# Patient Record
Sex: MALE | Race: WHITE | NOT HISPANIC OR LATINO | URBAN - METROPOLITAN AREA
[De-identification: names, ages, dates, MRNs, and addresses within clinical notes are randomized per-mention and may not be internally consistent; named-entity substitution may affect disease eponyms.]

---

## 2017-08-18 ENCOUNTER — INPATIENT (INPATIENT)
Facility: HOSPITAL | Age: 77
LOS: 0 days | Discharge: HOME | End: 2017-08-19
Attending: INTERNAL MEDICINE | Admitting: INTERNAL MEDICINE

## 2017-08-18 DIAGNOSIS — E78.5 HYPERLIPIDEMIA, UNSPECIFIED: ICD-10-CM

## 2017-08-18 DIAGNOSIS — Z87.898 PERSONAL HISTORY OF OTHER SPECIFIED CONDITIONS: ICD-10-CM

## 2017-08-18 DIAGNOSIS — R55 SYNCOPE AND COLLAPSE: ICD-10-CM

## 2017-08-18 DIAGNOSIS — R00.2 PALPITATIONS: ICD-10-CM

## 2017-08-22 DIAGNOSIS — Z87.891 PERSONAL HISTORY OF NICOTINE DEPENDENCE: ICD-10-CM

## 2017-08-22 DIAGNOSIS — S80.812A ABRASION, LEFT LOWER LEG, INITIAL ENCOUNTER: ICD-10-CM

## 2017-08-22 DIAGNOSIS — Z95.810 PRESENCE OF AUTOMATIC (IMPLANTABLE) CARDIAC DEFIBRILLATOR: ICD-10-CM
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
## 2017-08-22 DIAGNOSIS — I10 ESSENTIAL (PRIMARY) HYPERTENSION: ICD-10-CM
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
## 2017-08-22 DIAGNOSIS — R55 SYNCOPE AND COLLAPSE: ICD-10-CM
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
## 2017-08-22 DIAGNOSIS — M54.9 DORSALGIA, UNSPECIFIED: ICD-10-CM

## 2017-08-22 DIAGNOSIS — G89.29 OTHER CHRONIC PAIN: ICD-10-CM

## 2017-08-22 DIAGNOSIS — F10.129 ALCOHOL ABUSE WITH INTOXICATION, UNSPECIFIED: ICD-10-CM

## 2017-08-22 DIAGNOSIS — I25.10 ATHEROSCLEROTIC HEART DISEASE OF NATIVE CORONARY ARTERY WITHOUT ANGINA PECTORIS: ICD-10-CM

## 2018-12-10 ENCOUNTER — OUTPATIENT (OUTPATIENT)
Dept: OUTPATIENT SERVICES | Facility: HOSPITAL | Age: 78
LOS: 1 days | Discharge: HOME | End: 2018-12-10

## 2018-12-10 DIAGNOSIS — I25.10 ATHEROSCLEROTIC HEART DISEASE OF NATIVE CORONARY ARTERY WITHOUT ANGINA PECTORIS: ICD-10-CM

## 2019-08-26 ENCOUNTER — APPOINTMENT (OUTPATIENT)
Dept: CARDIOLOGY | Facility: CLINIC | Age: 79
End: 2019-08-26
Payer: MEDICARE

## 2019-08-26 PROCEDURE — 99213 OFFICE O/P EST LOW 20 MIN: CPT | Mod: 25

## 2019-08-26 PROCEDURE — 93283 PRGRMG EVAL IMPLANTABLE DFB: CPT | Mod: 59

## 2019-09-12 ENCOUNTER — APPOINTMENT (OUTPATIENT)
Dept: CARDIOLOGY | Facility: CLINIC | Age: 79
End: 2019-09-12
Payer: MEDICARE

## 2019-09-12 PROCEDURE — 93306 TTE W/DOPPLER COMPLETE: CPT

## 2019-12-16 ENCOUNTER — APPOINTMENT (OUTPATIENT)
Dept: CARDIOLOGY | Facility: CLINIC | Age: 79
End: 2019-12-16
Payer: MEDICARE

## 2019-12-16 PROCEDURE — 99213 OFFICE O/P EST LOW 20 MIN: CPT | Mod: 25

## 2019-12-16 PROCEDURE — 93283 PRGRMG EVAL IMPLANTABLE DFB: CPT | Mod: 59

## 2020-06-03 ENCOUNTER — EMERGENCY (EMERGENCY)
Facility: HOSPITAL | Age: 80
LOS: 0 days | Discharge: HOME | End: 2020-06-03
Attending: EMERGENCY MEDICINE | Admitting: EMERGENCY MEDICINE
Payer: MEDICARE

## 2020-06-03 VITALS
SYSTOLIC BLOOD PRESSURE: 133 MMHG | TEMPERATURE: 97 F | OXYGEN SATURATION: 95 % | HEART RATE: 65 BPM | DIASTOLIC BLOOD PRESSURE: 79 MMHG | RESPIRATION RATE: 18 BRPM

## 2020-06-03 DIAGNOSIS — M25.519 PAIN IN UNSPECIFIED SHOULDER: ICD-10-CM

## 2020-06-03 DIAGNOSIS — F17.210 NICOTINE DEPENDENCE, CIGARETTES, UNCOMPLICATED: ICD-10-CM

## 2020-06-03 DIAGNOSIS — I10 ESSENTIAL (PRIMARY) HYPERTENSION: ICD-10-CM

## 2020-06-03 DIAGNOSIS — E78.5 HYPERLIPIDEMIA, UNSPECIFIED: ICD-10-CM

## 2020-06-03 DIAGNOSIS — M25.512 PAIN IN LEFT SHOULDER: ICD-10-CM

## 2020-06-03 PROCEDURE — 99283 EMERGENCY DEPT VISIT LOW MDM: CPT

## 2020-06-03 PROCEDURE — 73030 X-RAY EXAM OF SHOULDER: CPT | Mod: 26,LT

## 2020-06-03 RX ORDER — IBUPROFEN 200 MG
600 TABLET ORAL ONCE
Refills: 0 | Status: COMPLETED | OUTPATIENT
Start: 2020-06-03 | End: 2020-06-03

## 2020-06-03 RX ADMIN — Medication 600 MILLIGRAM(S): at 13:48

## 2020-06-03 NOTE — ED PROVIDER NOTE - OBJECTIVE STATEMENT
78 yo male with a pmh of HTN, HLD, pacer, and chronic shoulder pain present c/p L shoulder pain. pt states pain is always presents but has been worsed fot the last two month. denies any recent injuries/fall/trauma. denies any other symptoms including fevers, chill, headache, recent illness/travel, cough, abdominal pain, chest pain, or SOB. follows with Dr. Gorman for pain management.

## 2020-06-03 NOTE — ED PROVIDER NOTE - CCCP TRG CHIEF CMPLNT
Refill request received for Boost. Last refilled 7/3/19. Refilled per protocol. Hospice patient.  
shoulder pain/injury

## 2020-06-03 NOTE — ED PROVIDER NOTE - NSFOLLOWUPINSTRUCTIONS_ED_ALL_ED_FT
Please follow up with your primary care physician within 24-72 hours and return immediately if symptoms worsen.    Shoulder Pain    AMBULATORY CARE:    Shoulder pain is a common problem that can affect your daily activities. Pain can be caused by a problem within your shoulder, such as soreness of a tendon or bursa. A tendon is a cord of tough tissue that connects your muscles to your bones. The bursa is a fluid-filled sac that acts as a cushion between a bone and a tendon. Shoulder pain may also be caused by pain that spreads to your shoulder from another part of your body.Shoulder Anatomy         Seek care immediately if:     You have severe pain.      You cannot move your arm or shoulder.      You have numbness or tingling in your shoulder or arm.    Contact your healthcare provider if:     Your pain gets worse or does not go away with treatment.      You have trouble moving your arm or shoulder.      You have questions or concerns about your condition or care.    Treatment for shoulder pain may include any of the following:     Acetaminophen decreases pain and fever. It is available without a doctor's order. Ask how much to take and how often to take it. Follow directions. Read the labels of all other medicines you are using to see if they also contain acetaminophen, or ask your doctor or pharmacist. Acetaminophen can cause liver damage if not taken correctly. Do not use more than 4 grams (4,000 milligrams) total of acetaminophen in one day.       NSAIDs, such as ibuprofen, help decrease swelling, pain, and fever. This medicine is available with or without a doctor's order. NSAIDs can cause stomach bleeding or kidney problems in certain people. If you take blood thinner medicine, always ask your healthcare provider if NSAIDs are safe for you. Always read the medicine label and follow directions.      A steroid injection may help decrease pain and swelling.      Surgery may be needed for long-term pain and loss of function.    Manage your symptoms:     Apply ice on your shoulder for 20 to 30 minutes every 2 hours or as directed. Use an ice pack, or put crushed ice in a plastic bag. Cover it with a towel before you apply it to your shoulder. Ice helps prevent tissue damage and decreases swelling and pain.      Apply heat if ice does not help your symptoms. Apply heat on your shoulder for 20 to 30 minutes every 2 hours for as many days as directed. Heat helps decrease pain and muscle spasms.      Limit activities as directed. Try to avoid repeated overhead movements.      Go to physical or occupational therapy as directed. A physical therapist teaches you exercises to help improve movement and strength, and to decrease pain. An occupational therapist teaches you skills to help with your daily activities.     Prevent shoulder pain:     Maintain a good range of motion in your shoulder. Ask your healthcare provider which exercises you should do on a regular basis after you have healed.       Stretch and strengthen your shoulder. Use proper technique during exercises and sports.    Follow up with your healthcare provider or orthopedist as directed: Write down your questions so you remember to ask them during your visits.

## 2020-06-03 NOTE — ED PROVIDER NOTE - PATIENT PORTAL LINK FT
You can access the FollowMyHealth Patient Portal offered by [facility] by registering at the following website: [email or web address]. By joining [employer]’s FollowMyHealth portal, you will also be able to view your health information using other applications (apps) compatible with our system.

## 2020-06-03 NOTE — ED PROVIDER NOTE - NSFOLLOWUPCLINICS_GEN_ALL_ED_FT
[facility] ([facility])  Rehabilitation  [facility] [address], [address] [place] [address]  Phone: [phone]  Fax:   Follow Up Time: 1-3 Days

## 2020-06-03 NOTE — ED PROVIDER NOTE - PHYSICAL EXAMINATION
Gen: NAD, AOx3  Head: NCAT  HEENT: PERRL, oral mucosa moist, normal conjunctiva, oropharynx clear without exudate or erythema  Lung: CTAB, no respiratory distress, no wheezing, rales, rhonchi  CV: normal s1/s2, rrr, Normal perfusion, pulses 2+ throughout  Abd: soft, NTND, no CVA tenderness  Genitourinary: no pelvic tenderness  MSK: No edema, no visible deformities, full range of motion in all 4 extremities, LUE: no edema/erythema/abrasions/lesions, cap refill <2 seconds, AROM, negative harris/neer test  Neuro: No focal neurologic deficits  Skin: No rash   Psych: normal affect

## 2020-06-03 NOTE — ED PROVIDER NOTE - CLINICAL SUMMARY MEDICAL DECISION MAKING FREE TEXT BOX
Pt with chronic L shoulder pain, worse after fall 1 month ago, here for imaging as was unable to get outpt MRI ordered by his pain management doctor.  x-ray with arthritis, no acute findings, pt to f/u with his pain management.

## 2020-06-03 NOTE — ED PROVIDER NOTE - ATTENDING CONTRIBUTION TO CARE
80 y/o male with h/o htn, s/p PPP, in ER with c/o L shoulder pain.  Pt states he has many old injuries and always has pain to this area, but had a fall ~ 1 month ago and it aggravated the pain.  Pain when he moves it, feels clicks sometimes, but states he's still been able to play golf.  He follows with pain management, states he had orders to go for MRI, but they told him couldn't be done 2/2 pacemaker.  Pt felt he needed some imaging and so came to ER.  no new injury.  no paresthesias or motor weakness.  no cp/sob.  no back pain.  abd pain.  no f/c.  no n/v.  no other complaints.  PE - nad, nc/at, eomi, perrl, op - clear, neck supple, cta b/l, no w/r/r, rrr, abd - soft, nt/nd, nabs, LUE - from to shoulder, no point tenderness, no swelling, no erythema, 2+ radial pulse, no motor/sensory deficits.  -check x-ray.

## 2020-06-04 ENCOUNTER — OUTPATIENT (OUTPATIENT)
Dept: OUTPATIENT SERVICES | Facility: HOSPITAL | Age: 80
LOS: 1 days | Discharge: HOME | End: 2020-06-04
Payer: MEDICARE

## 2020-06-04 DIAGNOSIS — M25.512 PAIN IN LEFT SHOULDER: ICD-10-CM

## 2020-06-04 PROCEDURE — 73200 CT UPPER EXTREMITY W/O DYE: CPT | Mod: 26,LT

## 2020-06-15 ENCOUNTER — OUTPATIENT (OUTPATIENT)
Dept: OUTPATIENT SERVICES | Facility: HOSPITAL | Age: 80
LOS: 1 days | Discharge: HOME | End: 2020-06-15
Payer: MEDICARE

## 2020-06-15 DIAGNOSIS — M75.102 UNSPECIFIED ROTATOR CUFF TEAR OR RUPTURE OF LEFT SHOULDER, NOT SPECIFIED AS TRAUMATIC: ICD-10-CM

## 2020-06-15 PROCEDURE — 73200 CT UPPER EXTREMITY W/O DYE: CPT | Mod: 26,LT

## 2020-06-15 PROCEDURE — 23350 INJECTION FOR SHOULDER X-RAY: CPT | Mod: LT

## 2020-06-15 PROCEDURE — 73040 CONTRAST X-RAY OF SHOULDER: CPT | Mod: 26,LT

## 2020-07-30 ENCOUNTER — RECORD ABSTRACTING (OUTPATIENT)
Age: 80
End: 2020-07-30

## 2020-07-30 DIAGNOSIS — I49.3 VENTRICULAR PREMATURE DEPOLARIZATION: ICD-10-CM

## 2020-07-30 DIAGNOSIS — Z87.891 PERSONAL HISTORY OF NICOTINE DEPENDENCE: ICD-10-CM

## 2020-07-30 DIAGNOSIS — K46.9 UNSPECIFIED ABDOMINAL HERNIA W/OUT OBSTRUCTION OR GANGRENE: ICD-10-CM

## 2020-07-30 DIAGNOSIS — Z86.79 PERSONAL HISTORY OF OTHER DISEASES OF THE CIRCULATORY SYSTEM: ICD-10-CM

## 2020-07-30 DIAGNOSIS — I42.6 ALCOHOLIC CARDIOMYOPATHY: ICD-10-CM

## 2020-07-30 DIAGNOSIS — I47.2 VENTRICULAR TACHYCARDIA: ICD-10-CM

## 2020-07-30 DIAGNOSIS — Z80.0 FAMILY HISTORY OF MALIGNANT NEOPLASM OF DIGESTIVE ORGANS: ICD-10-CM

## 2020-07-30 DIAGNOSIS — I45.10 UNSPECIFIED RIGHT BUNDLE-BRANCH BLOCK: ICD-10-CM

## 2020-08-03 ENCOUNTER — EMERGENCY (EMERGENCY)
Facility: HOSPITAL | Age: 80
LOS: 0 days | Discharge: HOME | End: 2020-08-03
Attending: EMERGENCY MEDICINE | Admitting: EMERGENCY MEDICINE
Payer: MEDICARE

## 2020-08-03 VITALS
OXYGEN SATURATION: 99 % | RESPIRATION RATE: 18 BRPM | SYSTOLIC BLOOD PRESSURE: 140 MMHG | TEMPERATURE: 98 F | DIASTOLIC BLOOD PRESSURE: 75 MMHG | HEART RATE: 61 BPM

## 2020-08-03 VITALS
TEMPERATURE: 98 F | SYSTOLIC BLOOD PRESSURE: 128 MMHG | OXYGEN SATURATION: 98 % | WEIGHT: 190.04 LBS | RESPIRATION RATE: 18 BRPM | HEART RATE: 77 BPM | HEIGHT: 72 IN | DIASTOLIC BLOOD PRESSURE: 92 MMHG

## 2020-08-03 DIAGNOSIS — R07.89 OTHER CHEST PAIN: ICD-10-CM

## 2020-08-03 DIAGNOSIS — R42 DIZZINESS AND GIDDINESS: ICD-10-CM

## 2020-08-03 DIAGNOSIS — F17.200 NICOTINE DEPENDENCE, UNSPECIFIED, UNCOMPLICATED: ICD-10-CM

## 2020-08-03 DIAGNOSIS — I10 ESSENTIAL (PRIMARY) HYPERTENSION: ICD-10-CM

## 2020-08-03 DIAGNOSIS — Z95.810 PRESENCE OF AUTOMATIC (IMPLANTABLE) CARDIAC DEFIBRILLATOR: ICD-10-CM

## 2020-08-03 DIAGNOSIS — R11.0 NAUSEA: ICD-10-CM

## 2020-08-03 DIAGNOSIS — Z95.0 PRESENCE OF CARDIAC PACEMAKER: ICD-10-CM

## 2020-08-03 DIAGNOSIS — E78.00 PURE HYPERCHOLESTEROLEMIA, UNSPECIFIED: ICD-10-CM

## 2020-08-03 DIAGNOSIS — E78.5 HYPERLIPIDEMIA, UNSPECIFIED: ICD-10-CM

## 2020-08-03 LAB
ALBUMIN SERPL ELPH-MCNC: 4.8 G/DL — SIGNIFICANT CHANGE UP (ref 3.5–5.2)
ALP SERPL-CCNC: 71 U/L — SIGNIFICANT CHANGE UP (ref 30–115)
ALT FLD-CCNC: 32 U/L — SIGNIFICANT CHANGE UP (ref 0–41)
ANION GAP SERPL CALC-SCNC: 14 MMOL/L — SIGNIFICANT CHANGE UP (ref 7–14)
AST SERPL-CCNC: 20 U/L — SIGNIFICANT CHANGE UP (ref 0–41)
BASOPHILS # BLD AUTO: 0.05 K/UL — SIGNIFICANT CHANGE UP (ref 0–0.2)
BASOPHILS NFR BLD AUTO: 0.6 % — SIGNIFICANT CHANGE UP (ref 0–1)
BILIRUB SERPL-MCNC: 0.8 MG/DL — SIGNIFICANT CHANGE UP (ref 0.2–1.2)
BUN SERPL-MCNC: 20 MG/DL — SIGNIFICANT CHANGE UP (ref 10–20)
CALCIUM SERPL-MCNC: 9.8 MG/DL — SIGNIFICANT CHANGE UP (ref 8.5–10.1)
CHLORIDE SERPL-SCNC: 104 MMOL/L — SIGNIFICANT CHANGE UP (ref 98–110)
CO2 SERPL-SCNC: 26 MMOL/L — SIGNIFICANT CHANGE UP (ref 17–32)
CREAT SERPL-MCNC: 1.7 MG/DL — HIGH (ref 0.7–1.5)
EOSINOPHIL # BLD AUTO: 0.12 K/UL — SIGNIFICANT CHANGE UP (ref 0–0.7)
EOSINOPHIL NFR BLD AUTO: 1.5 % — SIGNIFICANT CHANGE UP (ref 0–8)
GLUCOSE SERPL-MCNC: 114 MG/DL — HIGH (ref 70–99)
HCT VFR BLD CALC: 43.8 % — SIGNIFICANT CHANGE UP (ref 42–52)
HGB BLD-MCNC: 14.2 G/DL — SIGNIFICANT CHANGE UP (ref 14–18)
IMM GRANULOCYTES NFR BLD AUTO: 0.4 % — HIGH (ref 0.1–0.3)
LYMPHOCYTES # BLD AUTO: 1.5 K/UL — SIGNIFICANT CHANGE UP (ref 1.2–3.4)
LYMPHOCYTES # BLD AUTO: 18.6 % — LOW (ref 20.5–51.1)
MAGNESIUM SERPL-MCNC: 1.9 MG/DL — SIGNIFICANT CHANGE UP (ref 1.8–2.4)
MCHC RBC-ENTMCNC: 29.8 PG — SIGNIFICANT CHANGE UP (ref 27–31)
MCHC RBC-ENTMCNC: 32.4 G/DL — SIGNIFICANT CHANGE UP (ref 32–37)
MCV RBC AUTO: 92 FL — SIGNIFICANT CHANGE UP (ref 80–94)
MONOCYTES # BLD AUTO: 0.66 K/UL — HIGH (ref 0.1–0.6)
MONOCYTES NFR BLD AUTO: 8.2 % — SIGNIFICANT CHANGE UP (ref 1.7–9.3)
NEUTROPHILS # BLD AUTO: 5.71 K/UL — SIGNIFICANT CHANGE UP (ref 1.4–6.5)
NEUTROPHILS NFR BLD AUTO: 70.7 % — SIGNIFICANT CHANGE UP (ref 42.2–75.2)
NRBC # BLD: 0 /100 WBCS — SIGNIFICANT CHANGE UP (ref 0–0)
NT-PROBNP SERPL-SCNC: 421 PG/ML — HIGH (ref 0–300)
PLATELET # BLD AUTO: 315 K/UL — SIGNIFICANT CHANGE UP (ref 130–400)
POTASSIUM SERPL-MCNC: 3.9 MMOL/L — SIGNIFICANT CHANGE UP (ref 3.5–5)
POTASSIUM SERPL-SCNC: 3.9 MMOL/L — SIGNIFICANT CHANGE UP (ref 3.5–5)
PROT SERPL-MCNC: 6.4 G/DL — SIGNIFICANT CHANGE UP (ref 6–8)
RBC # BLD: 4.76 M/UL — SIGNIFICANT CHANGE UP (ref 4.7–6.1)
RBC # FLD: 14.7 % — HIGH (ref 11.5–14.5)
SODIUM SERPL-SCNC: 144 MMOL/L — SIGNIFICANT CHANGE UP (ref 135–146)
TROPONIN T SERPL-MCNC: <0.01 NG/ML — SIGNIFICANT CHANGE UP
WBC # BLD: 8.07 K/UL — SIGNIFICANT CHANGE UP (ref 4.8–10.8)
WBC # FLD AUTO: 8.07 K/UL — SIGNIFICANT CHANGE UP (ref 4.8–10.8)

## 2020-08-03 PROCEDURE — 99285 EMERGENCY DEPT VISIT HI MDM: CPT

## 2020-08-03 PROCEDURE — 93010 ELECTROCARDIOGRAM REPORT: CPT

## 2020-08-03 PROCEDURE — 70450 CT HEAD/BRAIN W/O DYE: CPT | Mod: 26

## 2020-08-03 PROCEDURE — 71045 X-RAY EXAM CHEST 1 VIEW: CPT | Mod: 26

## 2020-08-03 RX ORDER — SODIUM CHLORIDE 9 MG/ML
500 INJECTION INTRAMUSCULAR; INTRAVENOUS; SUBCUTANEOUS ONCE
Refills: 0 | Status: COMPLETED | OUTPATIENT
Start: 2020-08-03 | End: 2020-08-03

## 2020-08-03 RX ADMIN — SODIUM CHLORIDE 500 MILLILITER(S): 9 INJECTION INTRAMUSCULAR; INTRAVENOUS; SUBCUTANEOUS at 08:03

## 2020-08-03 NOTE — ED PROVIDER NOTE - CLINICAL SUMMARY MEDICAL DECISION MAKING FREE TEXT BOX
Patient urged to remain in the hospital on telemetry for further workup of ACS including repeat troponin, EKG and EP evaluation.  Patient refuses further care.  Wants to go home.  AAOx3.  See progress notes.

## 2020-08-03 NOTE — ED ADULT NURSE REASSESSMENT NOTE - NS ED NURSE REASSESS COMMENT FT1
not seen by MD yet , no orders yet
pt assessed, pt resting comfortable in bed, pt on cardiac monitor, pt ambulated to bathroom with steady gait, safety and comfort maintained, will continue to monitor.

## 2020-08-03 NOTE — ED ADULT NURSE NOTE - OBJECTIVE STATEMENT
Pt presents c/o midsternal chest discomfort  , dizziness , occipital headache that started last night  . pt HX defibrillator placement , AO x 4 , speaks in full sentences , no labored breathing noted , denies syncopal episode , denies nausea , denies abdominal pain , no vomiting noted , denies palpitations , denies back pain , denies dysuria  , afebrile , AO x 4 , no cough , no SOB , ambulates with steady gait , denies visual  changes ., placed on continuous cardiac monitor

## 2020-08-03 NOTE — ED ADULT NURSE NOTE - NSIMPLEMENTINTERV_GEN_ALL_ED
Implemented All Universal Safety Interventions:  [place] to call system. Call bell, personal items and telephone within reach. Instruct patient to call for assistance. Room bathroom lighting operational. Non-slip footwear when patient is off stretcher. Physically safe environment: no spills, clutter or unnecessary equipment. Stretcher in lowest position, wheels locked, appropriate side rails in place.

## 2020-08-03 NOTE — ED PROVIDER NOTE - PATIENT PORTAL LINK FT
You can access the FollowMyHealth Patient Portal offered by [facility] by registering at the following website: [email or web address]. By joining [employer]’s FollowMyHealth portal, you will also be able to view your health information using other applications (apps) compatible with our system.

## 2020-08-03 NOTE — ED PROVIDER NOTE - NSFOLLOWUPINSTRUCTIONS_ED_ALL_ED_FT
Dizziness    Dizziness can manifest as a feeling of unsteadiness or light-headedness. You may feel like you are about to faint. This condition can be caused by a number of things, including medicines, dehydration, or illness. Drink enough fluid to keep your urine clear or pale yellow. Do not drink alcohol and limit your caffeine intake. Avoid quick or sudden movements.  Rise slowly from chairs and steady yourself until you feel okay. In the morning, first sit up on the side of the bed.    SEEK IMMEDIATE MEDICAL CARE IF YOU HAVE ANY OF THE FOLLOWING SYMPTOMS: vomiting, changes in your vision or speech, weakness in your arms or legs, trouble speaking or swallowing, chest pain, abdominal pain, shortness of breath, sweating, bleeding, headache, neck pain, or fever.    Chest Pain    Chest pain can be caused by many different conditions which may or may not be dangerous. Causes include heartburn, lung infections, heart attack, blood clot in lungs, skin infections, strain or damage to muscle, cartilage, or bones, etc. In addition to a history and physical examination, an electrocardiogram (ECG) or other lab tests may have been performed to determine the cause of your chest pain. Follow up with your primary care provider or with a cardiologist as instructed.     SEEK IMMEDIATE MEDICAL CARE IF YOU HAVE ANY OF THE FOLLOWING SYMPTOMS: worsening chest pain, coughing up blood, unexplained back/neck/jaw pain, severe abdominal pain, dizziness or lightheadedness, fainting, shortness of breath, sweaty or clammy skin, vomiting, or racing heart beat. These symptoms may represent a serious problem that is an emergency. Do not wait to see if the symptoms will go away. Get medical help right away. Call 911 and do not drive yourself to the hospital.

## 2020-08-03 NOTE — ED PROVIDER NOTE - OBJECTIVE STATEMENT
78 y/o male with Pacemaker /Defibrillator, HTN, HDL, smoker presents to the ED c/o "I got up to go to the bathroom this morning and I felt very dizzy and nauseous. I have left sided chest burning. My defibrillator didn't go off." no SOB/ cough/ fever/ chills 78 y/o male with Pacemaker /Defibrillator, HTN, HDL, Dementia smoker presents to the ED c/o "I got up to go to the bathroom this morning and I felt very dizzy and nauseous. I have left sided chest burning. My defibrillator didn't go off." no SOB/ cough/ fever/ chills 80 y/o male with Pacemaker /Defibrillator, HTN, HDL,  smoker presents to the ED c/o "I got up to go to the bathroom this morning and I felt very dizzy and nauseous. I have left sided chest burning. My defibrillator didn't go off." no SOB/ cough/ fever/ chills

## 2020-08-03 NOTE — ED PROVIDER NOTE - ATTENDING CONTRIBUTION TO CARE
80 y/o male current everyday smoker with hx of HTN, dyslipidemia, and AICD presents to ED for dizziness and nausea associated with left sided chest pain this AM.  Symptoms were non exertional and resolved after 15-30 minutes.  No dyspnea.  No cough, no fever or chills. No N/V.  PE:  agree with above.  A/P:  R/O ACS.  Possible AICD fire although patient denies.

## 2020-08-06 PROBLEM — E78.00 PURE HYPERCHOLESTEROLEMIA, UNSPECIFIED: Chronic | Status: ACTIVE | Noted: 2020-08-03

## 2020-08-06 PROBLEM — I10 ESSENTIAL (PRIMARY) HYPERTENSION: Chronic | Status: ACTIVE | Noted: 2020-08-03

## 2020-08-28 ENCOUNTER — EMERGENCY (EMERGENCY)
Facility: HOSPITAL | Age: 80
LOS: 0 days | Discharge: HOME | End: 2020-08-29
Attending: EMERGENCY MEDICINE | Admitting: HOSPITALIST
Payer: MEDICARE

## 2020-08-28 VITALS
DIASTOLIC BLOOD PRESSURE: 65 MMHG | OXYGEN SATURATION: 97 % | TEMPERATURE: 96 F | HEART RATE: 53 BPM | SYSTOLIC BLOOD PRESSURE: 157 MMHG | RESPIRATION RATE: 18 BRPM

## 2020-08-28 DIAGNOSIS — Z95.0 PRESENCE OF CARDIAC PACEMAKER: ICD-10-CM

## 2020-08-28 DIAGNOSIS — R53.1 WEAKNESS: ICD-10-CM

## 2020-08-28 DIAGNOSIS — R41.82 ALTERED MENTAL STATUS, UNSPECIFIED: ICD-10-CM

## 2020-08-28 DIAGNOSIS — E78.5 HYPERLIPIDEMIA, UNSPECIFIED: ICD-10-CM

## 2020-08-28 DIAGNOSIS — R42 DIZZINESS AND GIDDINESS: ICD-10-CM

## 2020-08-28 DIAGNOSIS — F17.210 NICOTINE DEPENDENCE, CIGARETTES, UNCOMPLICATED: ICD-10-CM

## 2020-08-28 DIAGNOSIS — I10 ESSENTIAL (PRIMARY) HYPERTENSION: ICD-10-CM

## 2020-08-28 LAB
ALBUMIN SERPL ELPH-MCNC: 4.8 G/DL — SIGNIFICANT CHANGE UP (ref 3.5–5.2)
ALP SERPL-CCNC: 79 U/L — SIGNIFICANT CHANGE UP (ref 30–115)
ALT FLD-CCNC: 26 U/L — SIGNIFICANT CHANGE UP (ref 0–41)
ANION GAP SERPL CALC-SCNC: 14 MMOL/L — SIGNIFICANT CHANGE UP (ref 7–14)
AST SERPL-CCNC: 26 U/L — SIGNIFICANT CHANGE UP (ref 0–41)
BASOPHILS # BLD AUTO: 0.09 K/UL — SIGNIFICANT CHANGE UP (ref 0–0.2)
BASOPHILS NFR BLD AUTO: 0.6 % — SIGNIFICANT CHANGE UP (ref 0–1)
BILIRUB SERPL-MCNC: 0.5 MG/DL — SIGNIFICANT CHANGE UP (ref 0.2–1.2)
BUN SERPL-MCNC: 27 MG/DL — HIGH (ref 10–20)
CALCIUM SERPL-MCNC: 9.5 MG/DL — SIGNIFICANT CHANGE UP (ref 8.5–10.1)
CHLORIDE SERPL-SCNC: 102 MMOL/L — SIGNIFICANT CHANGE UP (ref 98–110)
CO2 SERPL-SCNC: 22 MMOL/L — SIGNIFICANT CHANGE UP (ref 17–32)
CREAT SERPL-MCNC: 1.7 MG/DL — HIGH (ref 0.7–1.5)
EOSINOPHIL # BLD AUTO: 0.03 K/UL — SIGNIFICANT CHANGE UP (ref 0–0.7)
EOSINOPHIL NFR BLD AUTO: 0.2 % — SIGNIFICANT CHANGE UP (ref 0–8)
GLUCOSE SERPL-MCNC: 146 MG/DL — HIGH (ref 70–99)
HCT VFR BLD CALC: 46 % — SIGNIFICANT CHANGE UP (ref 42–52)
HGB BLD-MCNC: 15 G/DL — SIGNIFICANT CHANGE UP (ref 14–18)
IMM GRANULOCYTES NFR BLD AUTO: 0.6 % — HIGH (ref 0.1–0.3)
LIDOCAIN IGE QN: 31 U/L — SIGNIFICANT CHANGE UP (ref 7–60)
LYMPHOCYTES # BLD AUTO: 1.73 K/UL — SIGNIFICANT CHANGE UP (ref 1.2–3.4)
LYMPHOCYTES # BLD AUTO: 10.7 % — LOW (ref 20.5–51.1)
MAGNESIUM SERPL-MCNC: 1.9 MG/DL — SIGNIFICANT CHANGE UP (ref 1.8–2.4)
MCHC RBC-ENTMCNC: 30.1 PG — SIGNIFICANT CHANGE UP (ref 27–31)
MCHC RBC-ENTMCNC: 32.6 G/DL — SIGNIFICANT CHANGE UP (ref 32–37)
MCV RBC AUTO: 92.2 FL — SIGNIFICANT CHANGE UP (ref 80–94)
MONOCYTES # BLD AUTO: 0.96 K/UL — HIGH (ref 0.1–0.6)
MONOCYTES NFR BLD AUTO: 5.9 % — SIGNIFICANT CHANGE UP (ref 1.7–9.3)
NEUTROPHILS # BLD AUTO: 13.31 K/UL — HIGH (ref 1.4–6.5)
NEUTROPHILS NFR BLD AUTO: 82 % — HIGH (ref 42.2–75.2)
NRBC # BLD: 0 /100 WBCS — SIGNIFICANT CHANGE UP (ref 0–0)
NT-PROBNP SERPL-SCNC: 363 PG/ML — HIGH (ref 0–300)
PLATELET # BLD AUTO: 364 K/UL — SIGNIFICANT CHANGE UP (ref 130–400)
POTASSIUM SERPL-MCNC: 4.8 MMOL/L — SIGNIFICANT CHANGE UP (ref 3.5–5)
POTASSIUM SERPL-SCNC: 4.8 MMOL/L — SIGNIFICANT CHANGE UP (ref 3.5–5)
PROT SERPL-MCNC: 6.5 G/DL — SIGNIFICANT CHANGE UP (ref 6–8)
RBC # BLD: 4.99 M/UL — SIGNIFICANT CHANGE UP (ref 4.7–6.1)
RBC # FLD: 14.3 % — SIGNIFICANT CHANGE UP (ref 11.5–14.5)
SODIUM SERPL-SCNC: 138 MMOL/L — SIGNIFICANT CHANGE UP (ref 135–146)
TROPONIN T SERPL-MCNC: <0.01 NG/ML — SIGNIFICANT CHANGE UP
WBC # BLD: 16.21 K/UL — HIGH (ref 4.8–10.8)
WBC # FLD AUTO: 16.21 K/UL — HIGH (ref 4.8–10.8)

## 2020-08-28 PROCEDURE — 99284 EMERGENCY DEPT VISIT MOD MDM: CPT | Mod: CS,GC

## 2020-08-28 PROCEDURE — 93010 ELECTROCARDIOGRAM REPORT: CPT

## 2020-08-28 PROCEDURE — 70450 CT HEAD/BRAIN W/O DYE: CPT | Mod: 26

## 2020-08-28 PROCEDURE — 71045 X-RAY EXAM CHEST 1 VIEW: CPT | Mod: 26

## 2020-08-28 RX ORDER — SODIUM CHLORIDE 9 MG/ML
1000 INJECTION, SOLUTION INTRAVENOUS ONCE
Refills: 0 | Status: COMPLETED | OUTPATIENT
Start: 2020-08-28 | End: 2020-08-28

## 2020-08-28 RX ADMIN — SODIUM CHLORIDE 1000 MILLILITER(S): 9 INJECTION, SOLUTION INTRAVENOUS at 21:08

## 2020-08-28 NOTE — ED PROVIDER NOTE - PHYSICAL EXAMINATION
Vital Signs: I have reviewed the initial vital signs.  Constitutional: well-nourished, appears stated age, no acute distress.  HEENT: Airway patent, protected.   CV: regular rate, regular rhythm, well-perfused extremities, 2+ b/l DP and radial pulses equal.  Lungs: BCTA, no increased WOB.  ABD: soft, NTND, no guarding or rebound, no pulsatile mass, no hernias.   MSK: Neck supple, nontender, nl ROM, no stepoff. Chest nontender. Back nontender in TLS spine or to b/l bony structures or flanks. Ext nontender, nl rom, no deformity.   INTEG: Skin warm, dry, no rash.  NEURO: A&Ox3, moving all extremities, normal speech  PSYCH: Calm, cooperative, normal affect and interaction.

## 2020-08-28 NOTE — ED PROVIDER NOTE - OBJECTIVE STATEMENT
80M hx  Pacemaker /Defibrillator, HTN, HDL,  smoker presents with weakness. Patient lives by himself, notes that he has had chronic light-headedness that has acutely worsened today, felt like he was about to pass out but didn't, endorses 2 episodes of vomiting that was NB/NB, denies any melena/hematochezia/chest pain/SOB/abd pain.

## 2020-08-28 NOTE — ED PROVIDER NOTE - CLINICAL SUMMARY MEDICAL DECISION MAKING FREE TEXT BOX
81 yo M presented to Ed for presyncope. Due to recent personality change and forgetfulness will admit pt for further workup including dementia workup.

## 2020-08-28 NOTE — ED ADULT TRIAGE NOTE - CHIEF COMPLAINT QUOTE
pt sent to ED from pain management MD's office because pt presented to MD's office appearing diaphoretic, pale, and began vomiting with confusion. pt has a history of using alcohol with his pain medications. as per pt's daughter, pt's mental status and ability to care for himself has been declining over the last 5 years.  pt can not recall what happened, pt can not recall that he was in the doctor's office.

## 2020-08-28 NOTE — ED PROVIDER NOTE - ATTENDING CONTRIBUTION TO CARE
79 yo M with PMH of pacemaker/AICD, HTN presents to ED for weakness. Pt was at his pain management doctor when he began to act strangely. Pt states he felt weak and was presyncopal. No CP, SOB, abdominal pain.   According to the daughter pt has episodes like this. She has also noticed he has become more aggressive and had memory loss.     Const: Well nourished, well developed, appears stated age  Eyes: PERRL, no conjunctival injection  HENT:  Neck supple without meningismus   CV: RRR, Warm, well-perfused extremities  RESP: CTA B/L, no tachypnea   GI: soft, non-tender, non-distended  MSK: No gross deformities appreciated  Skin: Warm, dry. No rashes  Neuro: Alert, CNs II-XII grossly intact. Sensation and motor function of extremities grossly intact.  Psych: Appropriate mood and affect.    Will do CT head, labs, EKG xray

## 2020-08-29 VITALS
TEMPERATURE: 98 F | HEART RATE: 62 BPM | OXYGEN SATURATION: 100 % | SYSTOLIC BLOOD PRESSURE: 132 MMHG | RESPIRATION RATE: 18 BRPM | DIASTOLIC BLOOD PRESSURE: 82 MMHG

## 2020-08-29 LAB
APPEARANCE UR: CLEAR — SIGNIFICANT CHANGE UP
BILIRUB UR-MCNC: NEGATIVE — SIGNIFICANT CHANGE UP
COLOR SPEC: YELLOW — SIGNIFICANT CHANGE UP
DIFF PNL FLD: NEGATIVE — SIGNIFICANT CHANGE UP
GLUCOSE UR QL: NEGATIVE — SIGNIFICANT CHANGE UP
KETONES UR-MCNC: NEGATIVE — SIGNIFICANT CHANGE UP
LEUKOCYTE ESTERASE UR-ACNC: NEGATIVE — SIGNIFICANT CHANGE UP
NITRITE UR-MCNC: NEGATIVE — SIGNIFICANT CHANGE UP
PH UR: 5.5 — SIGNIFICANT CHANGE UP (ref 5–8)
PROT UR-MCNC: SIGNIFICANT CHANGE UP
SARS-COV-2 RNA SPEC QL NAA+PROBE: SIGNIFICANT CHANGE UP
SP GR SPEC: 1.02 — SIGNIFICANT CHANGE UP (ref 1.01–1.02)
UROBILINOGEN FLD QL: ABNORMAL

## 2020-08-29 NOTE — CHART NOTE - NSCHARTNOTEFT_GEN_A_CORE
Was called by RN pt wanted to leave AMA. Presented to bedside, explained the risk of leaving AMA which include death. Pt verbally stated his understanding and no longer want to pursue his workup at the hospital. RN by bedside, AMA form completed. Pt discharged.

## 2020-09-04 ENCOUNTER — APPOINTMENT (OUTPATIENT)
Dept: CARDIOLOGY | Facility: CLINIC | Age: 80
End: 2020-09-04

## 2020-10-08 NOTE — ED PROVIDER NOTE - DATE/TIME 1
Joshua Medrano  [address] [address]    10/08/20    Dear Joshua Medrano ,    We have been unsuccessful in our attempts to contact you regarding your Anticoagulation Service appointments. Warfarin is a potent blood-thinning agent that requires monitoring to ensure that the dosage is correct for your body.  If it isn't, you could develop serious, sometimes life-threatening bleeding problems or life-threatening blood clots or stroke could result.    To monitor you effectively, we need to be able to communicate with you.  This is a requirement to be followed by our Service.       If you repeatedly fail to keep your lab appointments, you are at risk of being discharged from the Anticoagulation Service.    It is extremely important that you contact the clinic as soon as possible to arrange appropriate follow up.  We are open Monday-Friday 8 am until 5 pm.  You may reach our Service at [phone].           Sincerely,           Noel Veags, PharmD, [facility]  Clinic Supervisor  [facility]  Outpatient Anticoagulation Service          
03-Aug-2020 10:59

## 2020-10-16 NOTE — ED ADULT NURSE NOTE - HIV OFFER
Date:  10/16/20     Name:  Chema Dietz  :  1960  MRN:  [id number]     PCP:  Terrence Cantu MD    No chief complaint on file. Vini Grewal, who was evaluated through a synchronous (real-time) audio-video encounter, and/or her healthcare decision maker, is aware that it is a billable service, with coverage as determined by her insurance carrier. She provided verbal consent to proceed: Yes, and patient identification was verified. It was conducted pursuant to the emergency declaration under the [facility], [address] [place] [address] authority and the [employer] Act. A caregiver was present when appropriate. Ability to conduct physical exam was limited. I was in the office. The patient was at home. HISTORY OF PRESENT ILLNESS:Follow up visit for seizure. She is being maintain on Keppra 500 mg BID and doing well. No seizure or seizure like activity. No concern at today visit. She would like to get a referral bariatric surgery . She would like to lose weight to help with there other Comorbidity (COPD,Sleep apnea) and being more active. Review of Systems   Constitutional: Negative. Eyes: Negative for blurred vision, double vision and photophobia. Neurological: Negative for dizziness, tingling, seizures and headaches. Psychiatric/Behavioral: Negative for depression and suicidal ideas. Current Outpatient Medications   Medication Sig    levETIRAcetam (KEPPRA) 500 mg tablet Take 1 tablet by mouth twice daily    carvediloL (COREG) 12.5 mg tablet Take 1 Tab by mouth two (2) times a day. Indications: high blood pressure    losartan (COZAAR) 25 mg tablet Take 1 Tab by mouth daily.  busPIRone (BUSPAR) 10 mg tablet TAKE 1/2 TABLETS BY MOUTH TO 2 THREE TIMES DAILY AS NEEDED FOR ANXIETY    metFORMIN (GLUCOPHAGE) 500 mg tablet Take 1 Tab by mouth two (2) times daily (with meals).     albuterol (PROVENTIL VENTOLIN) 2.5 mg /3 mL (0.083 %) nebu     torsemide (DEMADEX) 20 mg tablet Take 20 mg by mouth two (2) times a day.  aspirin 81 mg chewable tablet Take 1 Tab by mouth daily.  naproxen (NAPROSYN) 375 mg tablet Take 375 mg by mouth two (2) times daily (with meals).  umeclidinium-vilanterol (ANORO ELLIPTA) 62.5-25 mcg/actuation inhaler Take 1 Puff by inhalation daily.  fluticasone propionate (FLOVENT DISKUS) 250 mcg/actuation dsdv Take 250 mcg by inhalation two (2) times a day.  albuterol (PROVENTIL HFA, VENTOLIN HFA, PROAIR HFA) 90 mcg/actuation inhaler Take 1 Puff by inhalation every six (6) hours as needed for Wheezing. No current facility-administered medications for this visit. Allergies   Allergen Reactions    Pcn [Penicillins] Unknown (comments)     Past Medical History:   Diagnosis Date    Chronic obstructive pulmonary disease ([facility]     Hypertension     Seizures ([facility]      No past surgical history on file.   Social History     Socioeconomic History    Marital status: SINGLE     Spouse name: Not on file    Number of children: Not on file    Years of education: Not on file    Highest education level: Not on file   Occupational History    Occupation: Adm Asst   Social Needs    Financial resource strain: Not on file    Food insecurity     Worry: Not on file     Inability: Not on file    Transportation needs     Medical: Not on file     Non-medical: Not on file   Tobacco Use    Smoking status: Former Smoker    Smokeless tobacco: Never Used   Substance and Sexual Activity    Alcohol use: Not Currently    Drug use: Not Currently    Sexual activity: Not Currently   Lifestyle    Physical activity     Days per week: Not on file     Minutes per session: Not on file    Stress: Not on file   Relationships    Social connections     Talks on phone: Not on file     Gets together: Not on file     Attends Voodoo service: Not on file     Active member of club or organization: Not on file     Attends meetings of clubs or organizations: Not on file     Relationship status: Not on file    Intimate partner violence     Fear of current or ex partner: Not on file     Emotionally abused: Not on file     Physically abused: Not on file     Forced sexual activity: Not on file   Other Topics Concern    Not on file   Social History Narrative    Lives in [place]      Family History   Problem Relation Age of Onset    Breast Cancer Mother     Heart Attack Father     Hypertension Sister     Hypertension Brother     Cancer Maternal Aunt         \"bone\"       PHYSICAL EXAMINATION:    There were no vitals taken for this visit. Due to this being a TeleHealth evaluation, many elements of the physical examination are unable to be assessed. Exam:  NEUROLOGICAL EXAM:  General: Awake, alert, speech fluent  CN: EOMI, VF intact, facial strength normal and symmetric, hearing is intact  Motor: AG x 4  Reflexes: deferred  Coordination: No ataxia. Sensation: LT intact throughout  Gait:        ASSESSMENT AND PLAN    ICD-10-CM ICD-9-CM    1. Localization-related epilepsy ([facility]  G40.109 345.50    2. Class 3 severe obesity with serious comorbidity and body mass index (BMI) of 50.0 to 59.9 in adult, unspecified obesity type ([facility]  E66.01 278.01 REFERRAL TO BARIATRIC SURGERY    Z68.43 V85.43      1. Localization-related epilepsy ([facility]   - continue keppra 500mg BID     2. Class 3 severe obesity with serious comorbidity and body mass index (BMI) of 50.0 to 59.9 in adult, unspecified obesity type ([facility]    - REFERRAL TO BARIATRIC SURGERY     Follow up in 1 year   This note will not be viewable in [employer].   Adi Erwin NP Opt out

## 2020-10-28 ENCOUNTER — APPOINTMENT (OUTPATIENT)
Dept: CARDIOLOGY | Facility: CLINIC | Age: 80
End: 2020-10-28
Payer: MEDICARE

## 2020-10-28 VITALS
HEART RATE: 63 BPM | TEMPERATURE: 98.8 F | WEIGHT: 194 LBS | HEIGHT: 72 IN | BODY MASS INDEX: 26.28 KG/M2 | DIASTOLIC BLOOD PRESSURE: 80 MMHG | SYSTOLIC BLOOD PRESSURE: 140 MMHG

## 2020-10-28 VITALS — DIASTOLIC BLOOD PRESSURE: 80 MMHG | SYSTOLIC BLOOD PRESSURE: 116 MMHG

## 2020-10-28 DIAGNOSIS — Z00.00 ENCOUNTER FOR GENERAL ADULT MEDICAL EXAMINATION W/OUT ABNORMAL FINDINGS: ICD-10-CM

## 2020-10-28 DIAGNOSIS — Z95.810 PRESENCE OF AUTOMATIC (IMPLANTABLE) CARDIAC DEFIBRILLATOR: ICD-10-CM

## 2020-10-28 DIAGNOSIS — I10 ESSENTIAL (PRIMARY) HYPERTENSION: ICD-10-CM

## 2020-10-28 PROCEDURE — 93282 PRGRMG EVAL IMPLANTABLE DFB: CPT

## 2020-10-28 PROCEDURE — 93000 ELECTROCARDIOGRAM COMPLETE: CPT | Mod: 59

## 2020-10-28 PROCEDURE — 99213 OFFICE O/P EST LOW 20 MIN: CPT

## 2020-10-28 NOTE — ASSESSMENT
[FreeTextEntry1] : EKG SR    63/MIN LOW VOLTAGENON SPECEFIC  T WAVE CHANGED QT 0.44\par \par NO EVENTS \par BP CONTROLLED\par RV  THRESOLDS RISING 1.9@0.4  \par  PREVIOUS 1.2 @ 0.4\par NO EVENTS\par BATTERY 3 YRS\par \par PLAN CONTINUE THE SAME\par F/U IN APRIL 2021\par

## 2020-10-28 NOTE — PROCEDURE
[No] : not [NSR] : normal sinus rhythm [ICD] : Implantable cardioverter-defibrillator [Magnet Rate: ___ Ppm] : magnet rate was [unfilled] Ppm [Impedance: ___ Ohms] : current cell impedance is [unfilled] Ohms [Charge Time: ___ sec] : charge time was [unfilled] seconds [Longevity: ___ months] : The estimated remaining battery life is [unfilled] months [Threshold Testing Performed] : Threshold testing was performed [Lead Imp:  ___ohms] : lead impedance was [unfilled] ohms [Sensing Amplitude ___mv] : sensing amplitude was [unfilled] mv [___V @] : [unfilled] V [___ ms] : [unfilled] ms [None] : none [Counters Reset] : the counters were reset [Sense ___ %] : Sense [unfilled]% [Pace ___ %] : Pace [unfilled]% [de-identified] : [employer] [de-identified] : Ilesto 7 VR-T DX [de-identified] : [id number] [de-identified] : 12/12/2014 [de-identified] : LINDA [de-identified] : Rise in Ventricular Threshold, and steady decline in ventricular sensing. No undersensing noted. Increased V. output to 3.5V@0.4ms

## 2020-10-28 NOTE — HISTORY OF PRESENT ILLNESS
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\par CARDIAC CATH NO CAD\par POST ICD VD DEC 2014\par 5 SHOCKS IN FEB 2015 WHILE PLAYING GOLF\par TODAY C/O DIZZINESS APRIL 2015\par \par MULTIPLE C/O PAIN AT SITE OF ICD\par POUNDING HEART BEATS AT NIGHT BACK PAIN 6/15/15\par \par FEELS GOOD WALKING MANY BLOCKS 10/19/15\par \par C/O PAIN AT SITE OF ICD,SHOCKS RADIATING TO HIS FEET 7/20/15\par \par NO CARDIAC C/O TODAY 10/19/15\par \par NO CARDIAC C/O TODAY 4/4/16\par \par NO CARDIAC C/O TODAY VERY ACTIVE 8/8/16\par \par NO CARDIAC C/O TODAY OCCASIONAL SHOOTING PAIN DOWN THE LEG 12/19/16\par \par NO CARDIAC C/O 4/24/17\par \par NO CARDIAC C/O BUT MILD BURNING NEAR PACEMAKER 10/2/17\par \par NO CARDIAC C/O 12/18/17\par \par NO CARDIAC C/O 7/9/18\par NO CARDIAC C/O 12/10/18\par \par NO CARDIAC C/O 4/15/19\par \par NO CARDIAC C/O 8/26/19\par \par NO CARDIAC C/O 12/16/2019 \par \par NO CARDIAC C/O  10/28/20
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
## 2021-04-07 ENCOUNTER — APPOINTMENT (OUTPATIENT)
Dept: CARDIOLOGY | Facility: CLINIC | Age: 81
End: 2021-04-07
Payer: MEDICARE

## 2021-04-07 VITALS
OXYGEN SATURATION: 98 % | HEIGHT: 72 IN | RESPIRATION RATE: 16 BRPM | TEMPERATURE: 97.8 F | HEART RATE: 57 BPM | BODY MASS INDEX: 24.92 KG/M2 | WEIGHT: 184 LBS | SYSTOLIC BLOOD PRESSURE: 114 MMHG | DIASTOLIC BLOOD PRESSURE: 76 MMHG

## 2021-04-07 PROCEDURE — 93000 ELECTROCARDIOGRAM COMPLETE: CPT | Mod: 59

## 2021-04-07 PROCEDURE — 93282 PRGRMG EVAL IMPLANTABLE DFB: CPT

## 2021-04-07 PROCEDURE — 93290 INTERROG DEV EVAL ICPMS IP: CPT | Mod: 26

## 2021-04-07 PROCEDURE — 99213 OFFICE O/P EST LOW 20 MIN: CPT

## 2021-04-21 NOTE — PROCEDURE
[No] : not [NSR] : normal sinus rhythm [ICD] : Implantable cardioverter-defibrillator [Threshold Testing Performed] : Threshold testing was performed [Lead Imp:  ___ohms] : lead impedance was [unfilled] ohms [Sensing Amplitude ___mv] : sensing amplitude was [unfilled] mv [___V @] : [unfilled] V [___ ms] : [unfilled] ms [None] : none [Counters Reset] : the counters were reset [Asense-Vsense ___ %] : Asense-Vsense [unfilled]% [de-identified] : [place] [de-identified] : Ilesto 7 VR-T DX [de-identified] : [id number] [de-identified] : LINDA [de-identified] : 12/12/2014 [de-identified] : 40 [de-identified] : 27% remaining

## 2021-04-21 NOTE — ASSESSMENT
[FreeTextEntry1] : ## Dilated Nonischemic cardiomyopathy s/p SC-ICD (VDD, Biotronik)\par \par - Continue with losartan\par - ICD interrogation shows normally functioning SC-ICD. Battery life ok. 'Optivol' below threshold. No new events.\par - Patient to call at the office with list of meds \par - Remote Monitoring\par - Return in 6 months\par \par \par

## 2021-04-21 NOTE — HISTORY OF PRESENT ILLNESS
[de-identified] : I had a pleasure of seeing Mr. WAITE for follow-up consultation for ICD care. He was previously being followed by Dr. Mao.\par \par Mr. WAITE is a 80 year-year old male with history of dilated cardiomyopathy suspected due to excessive alcohol use s/p ICD (Biotronik- VDD lead, 14), DL is here for routine f-up.\par \par Denies chest pain, shortness of breath, palpitation, dizziness or LOC except noted above.\par He appears to be very happy.\par Didn't bring meds or remember them.\par \par EKG: SR@ 57/min, QRSd 90 ms,  ms\par TTE (19): EF 55% (?)\par \par \par

## 2021-10-20 ENCOUNTER — EMERGENCY (EMERGENCY)
Facility: HOSPITAL | Age: 81
LOS: 0 days | Discharge: HOME | End: 2021-10-20
Payer: MEDICARE

## 2021-10-20 ENCOUNTER — APPOINTMENT (OUTPATIENT)
Dept: CARDIOLOGY | Facility: CLINIC | Age: 81
End: 2021-10-20
Payer: MEDICARE

## 2021-10-20 VITALS
TEMPERATURE: 98 F | RESPIRATION RATE: 16 BRPM | WEIGHT: 180 LBS | HEIGHT: 72 IN | HEART RATE: 71 BPM | BODY MASS INDEX: 24.38 KG/M2 | DIASTOLIC BLOOD PRESSURE: 80 MMHG | OXYGEN SATURATION: 98 % | SYSTOLIC BLOOD PRESSURE: 118 MMHG

## 2021-10-20 DIAGNOSIS — Z02.9 ENCOUNTER FOR ADMINISTRATIVE EXAMINATIONS, UNSPECIFIED: ICD-10-CM

## 2021-10-20 PROCEDURE — 93000 ELECTROCARDIOGRAM COMPLETE: CPT | Mod: 59

## 2021-10-20 PROCEDURE — 93290 INTERROG DEV EVAL ICPMS IP: CPT | Mod: 26

## 2021-10-20 PROCEDURE — 99213 OFFICE O/P EST LOW 20 MIN: CPT

## 2021-10-20 PROCEDURE — L9991: CPT

## 2021-10-20 PROCEDURE — 93282 PRGRMG EVAL IMPLANTABLE DFB: CPT

## 2021-10-20 NOTE — PROCEDURE
[No] : not [NSR] : normal sinus rhythm [See Scanned Paceart Report] : See scanned paceart report [See Device Printout] : See device printout [ICD] : Implantable cardioverter-defibrillator [Threshold Testing Performed] : Threshold testing was performed [Lead Imp:  ___ohms] : lead impedance was [unfilled] ohms [Sensing Amplitude ___mv] : sensing amplitude was [unfilled] mv [___V @] : [unfilled] V [___ ms] : [unfilled] ms [None] : none [Asense-Vsense ___ %] : Asense-Vsense [unfilled]% [de-identified] : [place] [de-identified] : Ilesto [de-identified] : 12/12/14 [de-identified] : LINDA [de-identified] : 40 [de-identified] : 21%

## 2021-10-20 NOTE — HISTORY OF PRESENT ILLNESS
[de-identified] : I had a pleasure of seeing Mr. WAITE for follow-up consultation for ICD care. He was previously being followed by Dr. Mao.\par \par Mr. 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 Mild HERNANDEZ. Occasional palpitations when hes doing his 20 lbs dumbbells.\par Denies chest pain, shortness of breath, palpitation, dizziness or LOC except noted above.\par He appears to be very happy.\par Didn't bring meds or remember them.\par \par EKG (10/20): SR@71, PVC (RVOT?)\par EKG: SR@ 57/min, QRSd 90 ms,  ms\par TTE (19): EF 55% (?)\par Cardio: None\par \par \par

## 2021-10-20 NOTE — ASSESSMENT
[FreeTextEntry1] : ## Dilated Nonischemic cardiomyopathy s/p SC-ICD (VDD, Biotronik, 14, Non-dep)\par \par - Continue with losartan\par - ICD interrogation shows normally functioning SC-ICD. Battery life ok. 'Optivol' below threshold. No new events.\par - Echo\par - Remote Monitoring- will enroll\par - Return in 6 months\par \par \par

## 2021-11-04 ENCOUNTER — NON-APPOINTMENT (OUTPATIENT)
Age: 81
End: 2021-11-04

## 2021-11-04 ENCOUNTER — APPOINTMENT (OUTPATIENT)
Dept: CARDIOLOGY | Facility: CLINIC | Age: 81
End: 2021-11-04
Payer: MEDICARE

## 2021-11-04 PROCEDURE — 93296 REM INTERROG EVL PM/IDS: CPT

## 2021-11-04 PROCEDURE — 93295 DEV INTERROG REMOTE 1/2/MLT: CPT

## 2022-01-03 ENCOUNTER — EMERGENCY (EMERGENCY)
Facility: HOSPITAL | Age: 82
LOS: 0 days | Discharge: LEFT BEFORE DISCHARGE | End: 2022-01-03
Attending: EMERGENCY MEDICINE | Admitting: EMERGENCY MEDICINE
Payer: MEDICARE

## 2022-01-03 VITALS
HEART RATE: 100 BPM | TEMPERATURE: 98 F | SYSTOLIC BLOOD PRESSURE: 132 MMHG | DIASTOLIC BLOOD PRESSURE: 90 MMHG | WEIGHT: 182.98 LBS | RESPIRATION RATE: 20 BRPM | OXYGEN SATURATION: 100 % | HEIGHT: 72 IN

## 2022-01-03 DIAGNOSIS — Z53.21 PROCEDURE AND TREATMENT NOT CARRIED OUT DUE TO PATIENT LEAVING PRIOR TO BEING SEEN BY HEALTH CARE PROVIDER: ICD-10-CM

## 2022-01-03 DIAGNOSIS — U07.1 COVID-19: ICD-10-CM

## 2022-01-03 PROCEDURE — L9981: CPT

## 2022-01-03 NOTE — ED ADULT TRIAGE NOTE - CHIEF COMPLAINT QUOTE
Patient brought in my daughter- patient +COVID as of today, complaining of lethargy, decreased appetite- patient lives by himself and is not taking care of himself.

## 2022-01-10 ENCOUNTER — NON-APPOINTMENT (OUTPATIENT)
Age: 82
End: 2022-01-10

## 2022-01-10 RX ORDER — ATORVASTATIN CALCIUM 40 MG/1
40 TABLET, FILM COATED ORAL
Refills: 0 | Status: DISCONTINUED | COMMUNITY
End: 2022-01-10

## 2022-02-03 ENCOUNTER — APPOINTMENT (OUTPATIENT)
Dept: CARDIOLOGY | Facility: CLINIC | Age: 82
End: 2022-02-03
Payer: MEDICARE

## 2022-02-03 ENCOUNTER — NON-APPOINTMENT (OUTPATIENT)
Age: 82
End: 2022-02-03

## 2022-02-03 PROCEDURE — 93296 REM INTERROG EVL PM/IDS: CPT

## 2022-02-03 PROCEDURE — 93295 DEV INTERROG REMOTE 1/2/MLT: CPT

## 2022-03-31 ENCOUNTER — NON-APPOINTMENT (OUTPATIENT)
Age: 82
End: 2022-03-31

## 2022-04-01 ENCOUNTER — NON-APPOINTMENT (OUTPATIENT)
Age: 82
End: 2022-04-01

## 2022-04-18 ENCOUNTER — NON-APPOINTMENT (OUTPATIENT)
Age: 82
End: 2022-04-18

## 2022-04-27 ENCOUNTER — APPOINTMENT (OUTPATIENT)
Dept: CARDIOLOGY | Facility: CLINIC | Age: 82
End: 2022-04-27

## 2022-07-11 ENCOUNTER — NON-APPOINTMENT (OUTPATIENT)
Age: 82
End: 2022-07-11

## 2022-07-28 ENCOUNTER — NON-APPOINTMENT (OUTPATIENT)
Age: 82
End: 2022-07-28

## 2022-07-28 ENCOUNTER — APPOINTMENT (OUTPATIENT)
Dept: CARDIOLOGY | Facility: CLINIC | Age: 82
End: 2022-07-28

## 2022-07-28 PROCEDURE — 93295 DEV INTERROG REMOTE 1/2/MLT: CPT

## 2022-07-28 PROCEDURE — 93296 REM INTERROG EVL PM/IDS: CPT

## 2022-08-03 ENCOUNTER — APPOINTMENT (OUTPATIENT)
Dept: CARDIOLOGY | Facility: CLINIC | Age: 82
End: 2022-08-03

## 2022-08-17 ENCOUNTER — APPOINTMENT (OUTPATIENT)
Dept: CARDIOLOGY | Facility: CLINIC | Age: 82
End: 2022-08-17

## 2022-08-17 VITALS — HEART RATE: 63 BPM | OXYGEN SATURATION: 99 % | DIASTOLIC BLOOD PRESSURE: 80 MMHG | SYSTOLIC BLOOD PRESSURE: 120 MMHG

## 2022-08-17 PROCEDURE — 93283 PRGRMG EVAL IMPLANTABLE DFB: CPT

## 2022-08-17 PROCEDURE — 93290 INTERROG DEV EVAL ICPMS IP: CPT | Mod: 26

## 2022-08-17 PROCEDURE — 99214 OFFICE O/P EST MOD 30 MIN: CPT

## 2022-08-17 PROCEDURE — 93000 ELECTROCARDIOGRAM COMPLETE: CPT | Mod: 59

## 2022-08-17 RX ORDER — ALPRAZOLAM 0.25 MG/1
0.25 TABLET ORAL
Refills: 0 | Status: DISCONTINUED | COMMUNITY
End: 2022-08-17

## 2022-08-18 NOTE — ASSESSMENT
[FreeTextEntry1] : ## Dilated Nonischemic cardiomyopathy s/p SC-ICD (VDD, Biotronik, 14, Non-dep)\par ## SVT\par \par - Continue with losartan\par - ICD interrogation shows normally functioning SC-ICD. Battery life ok. 'Optivol' below threshold. + SVT events. Zones changed to eliminate false 'vt' alarms\par - - Discussed different management option including clinical observation +/- medication and ablation. After detailed discussion, patient opted for conservative management. Patient will contact us if more episodes or changes the mind. \par - Continue Amiodarone\par - labs with PCP\par - Echo\par - Remote Monitoring\par - Return in 6 months\par \par \par

## 2022-08-18 NOTE — HISTORY OF PRESENT ILLNESS
[de-identified] : I had a pleasure of seeing Mr. WAITE for follow-up consultation for ICD care. He was previously being followed by Dr. Mao.\par \par Mr. 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 Mild HERNANDEZ. Occasional palpitations when hes doing his 20 lbs dumbbells.\par Denies chest pain, shortness of breath, palpitation, dizziness or LOC except noted above.\par He appears to be very happy.\par Didn't bring meds or remember them.\par \par 08/18/22: Feels fine. He is accompanied by his daughter. +SVT episodes on ICD. More forgetful.\par \par EKG (08/18/22): SR\par EKG (10/20): SR@71, PVC (RVOT?)\par EKG: SR@ 57/min, QRSd 90 ms,  ms\par TTE (19): EF 55% (?)\par Cardio: None

## 2022-08-18 NOTE — PROCEDURE
[No] : not [NSR] : normal sinus rhythm [See Device Printout] : See device printout [ICD] : Implantable cardioverter-defibrillator [Threshold Testing Performed] : Threshold testing was performed [Lead Imp:  ___ohms] : lead impedance was [unfilled] ohms [Sensing Amplitude ___mv] : sensing amplitude was [unfilled] mv [___V @] : [unfilled] V [___ ms] : [unfilled] ms [None] : none [Asense-Vsense ___ %] : Asense-Vsense [unfilled]% [de-identified] : LAVEGOk [de-identified] : Ilesto [de-identified] : 12/12/14 [de-identified] : LINDA [de-identified] : 40 [de-identified] : 10% [de-identified] : SVT episodes

## 2022-11-02 ENCOUNTER — NON-APPOINTMENT (OUTPATIENT)
Age: 82
End: 2022-11-02

## 2022-11-16 ENCOUNTER — NON-APPOINTMENT (OUTPATIENT)
Age: 82
End: 2022-11-16

## 2022-11-16 ENCOUNTER — APPOINTMENT (OUTPATIENT)
Dept: CARDIOLOGY | Facility: CLINIC | Age: 82
End: 2022-11-16

## 2022-11-16 PROCEDURE — 93296 REM INTERROG EVL PM/IDS: CPT

## 2022-11-16 PROCEDURE — 93295 DEV INTERROG REMOTE 1/2/MLT: CPT

## 2022-12-29 ENCOUNTER — NON-APPOINTMENT (OUTPATIENT)
Age: 82
End: 2022-12-29

## 2023-01-08 ENCOUNTER — INPATIENT (INPATIENT)
Facility: HOSPITAL | Age: 83
LOS: 2 days | Discharge: HOME | End: 2023-01-11
Attending: INTERNAL MEDICINE | Admitting: INTERNAL MEDICINE
Payer: MEDICARE

## 2023-01-08 VITALS
HEART RATE: 48 BPM | RESPIRATION RATE: 18 BRPM | WEIGHT: 169.98 LBS | SYSTOLIC BLOOD PRESSURE: 161 MMHG | DIASTOLIC BLOOD PRESSURE: 72 MMHG | OXYGEN SATURATION: 95 % | TEMPERATURE: 98 F

## 2023-01-08 DIAGNOSIS — I42.0 DILATED CARDIOMYOPATHY: ICD-10-CM

## 2023-01-08 DIAGNOSIS — R00.1 BRADYCARDIA, UNSPECIFIED: ICD-10-CM

## 2023-01-08 DIAGNOSIS — N18.30 CHRONIC KIDNEY DISEASE, STAGE 3 UNSPECIFIED: ICD-10-CM

## 2023-01-08 DIAGNOSIS — I42.6 ALCOHOLIC CARDIOMYOPATHY: ICD-10-CM

## 2023-01-08 DIAGNOSIS — R53.1 WEAKNESS: ICD-10-CM

## 2023-01-08 DIAGNOSIS — E83.42 HYPOMAGNESEMIA: ICD-10-CM

## 2023-01-08 DIAGNOSIS — Z95.810 PRESENCE OF AUTOMATIC (IMPLANTABLE) CARDIAC DEFIBRILLATOR: ICD-10-CM

## 2023-01-08 DIAGNOSIS — E78.00 PURE HYPERCHOLESTEROLEMIA, UNSPECIFIED: ICD-10-CM

## 2023-01-08 DIAGNOSIS — F03.911 UNSPECIFIED DEMENTIA, UNSPECIFIED SEVERITY, WITH AGITATION: ICD-10-CM

## 2023-01-08 DIAGNOSIS — R94.31 ABNORMAL ELECTROCARDIOGRAM [ECG] [EKG]: ICD-10-CM

## 2023-01-08 DIAGNOSIS — I12.9 HYPERTENSIVE CHRONIC KIDNEY DISEASE WITH STAGE 1 THROUGH STAGE 4 CHRONIC KIDNEY DISEASE, OR UNSPECIFIED CHRONIC KIDNEY DISEASE: ICD-10-CM

## 2023-01-08 LAB
ALBUMIN SERPL ELPH-MCNC: 4.5 G/DL — SIGNIFICANT CHANGE UP (ref 3.5–5.2)
ALP SERPL-CCNC: 84 U/L — SIGNIFICANT CHANGE UP (ref 30–115)
ALT FLD-CCNC: 16 U/L — SIGNIFICANT CHANGE UP (ref 0–41)
ANION GAP SERPL CALC-SCNC: 9 MMOL/L — SIGNIFICANT CHANGE UP (ref 7–14)
AST SERPL-CCNC: 11 U/L — SIGNIFICANT CHANGE UP (ref 0–41)
BASOPHILS # BLD AUTO: 0.05 K/UL — SIGNIFICANT CHANGE UP (ref 0–0.2)
BASOPHILS NFR BLD AUTO: 0.7 % — SIGNIFICANT CHANGE UP (ref 0–1)
BILIRUB SERPL-MCNC: 0.9 MG/DL — SIGNIFICANT CHANGE UP (ref 0.2–1.2)
BUN SERPL-MCNC: 20 MG/DL — SIGNIFICANT CHANGE UP (ref 10–20)
CALCIUM SERPL-MCNC: 9.3 MG/DL — SIGNIFICANT CHANGE UP (ref 8.4–10.4)
CHLORIDE SERPL-SCNC: 107 MMOL/L — SIGNIFICANT CHANGE UP (ref 98–110)
CO2 SERPL-SCNC: 27 MMOL/L — SIGNIFICANT CHANGE UP (ref 17–32)
CREAT SERPL-MCNC: 1.4 MG/DL — SIGNIFICANT CHANGE UP (ref 0.7–1.5)
EGFR: 51 ML/MIN/1.73M2 — LOW
EOSINOPHIL # BLD AUTO: 0.08 K/UL — SIGNIFICANT CHANGE UP (ref 0–0.7)
EOSINOPHIL NFR BLD AUTO: 1.2 % — SIGNIFICANT CHANGE UP (ref 0–8)
FLUAV AG NPH QL: SIGNIFICANT CHANGE UP
FLUBV AG NPH QL: SIGNIFICANT CHANGE UP
GLUCOSE SERPL-MCNC: 101 MG/DL — HIGH (ref 70–99)
HCT VFR BLD CALC: 41.4 % — LOW (ref 42–52)
HGB BLD-MCNC: 13.8 G/DL — LOW (ref 14–18)
IMM GRANULOCYTES NFR BLD AUTO: 0.4 % — HIGH (ref 0.1–0.3)
LYMPHOCYTES # BLD AUTO: 1.75 K/UL — SIGNIFICANT CHANGE UP (ref 1.2–3.4)
LYMPHOCYTES # BLD AUTO: 25.3 % — SIGNIFICANT CHANGE UP (ref 20.5–51.1)
MAGNESIUM SERPL-MCNC: 1.7 MG/DL — LOW (ref 1.8–2.4)
MCHC RBC-ENTMCNC: 29.4 PG — SIGNIFICANT CHANGE UP (ref 27–31)
MCHC RBC-ENTMCNC: 33.3 G/DL — SIGNIFICANT CHANGE UP (ref 32–37)
MCV RBC AUTO: 88.3 FL — SIGNIFICANT CHANGE UP (ref 80–94)
MONOCYTES # BLD AUTO: 0.59 K/UL — SIGNIFICANT CHANGE UP (ref 0.1–0.6)
MONOCYTES NFR BLD AUTO: 8.5 % — SIGNIFICANT CHANGE UP (ref 1.7–9.3)
NEUTROPHILS # BLD AUTO: 4.43 K/UL — SIGNIFICANT CHANGE UP (ref 1.4–6.5)
NEUTROPHILS NFR BLD AUTO: 63.9 % — SIGNIFICANT CHANGE UP (ref 42.2–75.2)
NRBC # BLD: 0 /100 WBCS — SIGNIFICANT CHANGE UP (ref 0–0)
NT-PROBNP SERPL-SCNC: 523 PG/ML — HIGH (ref 0–300)
PLATELET # BLD AUTO: 296 K/UL — SIGNIFICANT CHANGE UP (ref 130–400)
POTASSIUM SERPL-MCNC: 4 MMOL/L — SIGNIFICANT CHANGE UP (ref 3.5–5)
POTASSIUM SERPL-SCNC: 4 MMOL/L — SIGNIFICANT CHANGE UP (ref 3.5–5)
PROT SERPL-MCNC: 6 G/DL — SIGNIFICANT CHANGE UP (ref 6–8)
RBC # BLD: 4.69 M/UL — LOW (ref 4.7–6.1)
RBC # FLD: 14.4 % — SIGNIFICANT CHANGE UP (ref 11.5–14.5)
RSV RNA NPH QL NAA+NON-PROBE: SIGNIFICANT CHANGE UP
SARS-COV-2 RNA SPEC QL NAA+PROBE: SIGNIFICANT CHANGE UP
SODIUM SERPL-SCNC: 143 MMOL/L — SIGNIFICANT CHANGE UP (ref 135–146)
TROPONIN T SERPL-MCNC: <0.01 NG/ML — SIGNIFICANT CHANGE UP
TROPONIN T SERPL-MCNC: <0.01 NG/ML — SIGNIFICANT CHANGE UP
WBC # BLD: 6.93 K/UL — SIGNIFICANT CHANGE UP (ref 4.8–10.8)
WBC # FLD AUTO: 6.93 K/UL — SIGNIFICANT CHANGE UP (ref 4.8–10.8)

## 2023-01-08 PROCEDURE — 99223 1ST HOSP IP/OBS HIGH 75: CPT

## 2023-01-08 PROCEDURE — 93010 ELECTROCARDIOGRAM REPORT: CPT

## 2023-01-08 PROCEDURE — 71045 X-RAY EXAM CHEST 1 VIEW: CPT | Mod: 26

## 2023-01-08 PROCEDURE — 99285 EMERGENCY DEPT VISIT HI MDM: CPT | Mod: FS

## 2023-01-08 RX ORDER — HALOPERIDOL DECANOATE 100 MG/ML
2 INJECTION INTRAMUSCULAR ONCE
Refills: 0 | Status: COMPLETED | OUTPATIENT
Start: 2023-01-08 | End: 2023-01-08

## 2023-01-08 RX ORDER — MAGNESIUM SULFATE 500 MG/ML
1 VIAL (ML) INJECTION ONCE
Refills: 0 | Status: COMPLETED | OUTPATIENT
Start: 2023-01-08 | End: 2023-01-08

## 2023-01-08 RX ADMIN — Medication 100 GRAM(S): at 18:11

## 2023-01-08 RX ADMIN — Medication 2 MILLIGRAM(S): at 21:16

## 2023-01-08 RX ADMIN — HALOPERIDOL DECANOATE 2 MILLIGRAM(S): 100 INJECTION INTRAMUSCULAR at 22:31

## 2023-01-08 NOTE — H&P ADULT - NSHPPHYSICALEXAM_GEN_ALL_CORE
GENERAL: NAD  EYES: EOMI, PERRLA, conjunctiva and sclera clear  CHEST/LUNG: Clear to auscultation bilaterally; No rales, rhonchi, wheezing, or rubs. Unlabored respirations  HEART: Regular rate and rhythm; No murmurs, rubs, or gallops  ABDOMEN: BSx4; Soft, nontender, nondistended  NEURO: AAOx1

## 2023-01-08 NOTE — H&P ADULT - HISTORY OF PRESENT ILLNESS
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 Pt went to  and was found to be alva in the 40s and was sent to the ED.  spoke to Daughter Mary Lou on the phone. She said he lives alone and refuses any help after her trying multiple times. Has progressive dementia and wanders all by himself sometimes and went to the  himself.  wanted to send him to the hospital but pt refused, daughter was called and she had to convince him to sit in the ambulance. EP is aware that pt needs pacemaker but pt has refused in the past per daughter. Daughter does not even know if he takes his medications properly, pt contiues to refuse help.    ED vitals:   / 72, HR 48, RR 18, Temp 98, O2 sat 95% on RA     Sig labs:    otherwise unremarkable    EKG: looks like 1st degree AV block with junctional escape rhythm and PVCs?    XRAY: unremarkable    Pt was trying to leave AMA, was given haldol and ativan in the ED

## 2023-01-08 NOTE — ED ADULT NURSE NOTE - OBJECTIVE STATEMENT
Pt received in no acute distress, speaking in full sentences, sent in due to low heart rate and intermittent dizziness/lightheadedness. Daughter at bedside report pt has been seeing Cardiology recently for hear rhythm. Pt reports having 1 episode of a fall due to dizziness. Pt denies any symptoms at this time. Pt placed on continuous cardiac monitor with bradycardia noted. Pt with internal defibrillator noted to left upper chest.

## 2023-01-08 NOTE — ED PROVIDER NOTE - NS ED ROS FT
Constitutional: no recent weight loss  Eyes: no redness/discharge/pain/vision changes  ENT: no rhinorrhea/ear pain/sore throat  Cardiac: No chest pain, SOB or edema.  Respiratory: No cough or respiratory distress  GI: No nausea, vomiting, diarrhea or abdominal pain.  : No dysuria, frequency, urgency or hematuria  MS: no pain to back or extremities, no loss of ROM  Neuro: No headache. No LOC.  Skin: No skin rash.  Endocrine: No history of thyroid disease or diabetes.  Except as documented in the HPI, all other systems are negative.

## 2023-01-08 NOTE — H&P ADULT - NSHPREVIEWOFSYSTEMS_GEN_ALL_CORE
CONSTITUTIONAL: No weakness, fevers or chills  RESPIRATORY: No cough, wheezing, hemoptysis; No shortness of breath  CARDIOVASCULAR: No chest pain or palpitations

## 2023-01-08 NOTE — ED PROVIDER NOTE - OBJECTIVE STATEMENT
pt with pmhx defibrillator followed by Kevin, HTN, HLD presents to ED c/o generalized malaise/fatigue and subjective fever, so went to  for flu testing. found to be alva 40s and sent to ED. Denies fever/chill/HA/dizziness/chest pain/palpitation/sob/abd pain/n/v/d/ black stool/bloody stool/urinary sxs

## 2023-01-08 NOTE — H&P ADULT - ATTENDING COMMENTS
***HX and physical limited due to agitation. Supplemental information obtained from house staff and EMR. Unable to reach Daughter (Mary Lou) at the number provided.     83 YO M w/ a PMH of EtOH abuse, HLD, and ischemic cardiomyopathy s/p ICD (Biotronik- VDD lead, 14) who was sent into the hospital [place] [facility] [place] after presenting there w/ a c/o generalized weakness. Associated w/ dizziness. Unable to obtain ROS.     In the ED, pt became agitated and was given Haldol/Ativan. Currently on 1:1    FMHx:   -No family Hx of early cardiac death, CAD, asthma, or genetic disorders identified    Physical exam shows pt who does not want to participate in exam. VSS, afebrile, not hypoxic on RA. A&Ox? (unable to assess currently). Not following commands. Pt does not want to participate in exam, slightly agitated, just wants to sleep. Labs and radiology as above.     Generalized weakness, suspect viral process. no sepsis present on admission. RVP. Procal. ESR/CRP. PT. Fall precautions.     Dizziness, sinus bradycardia, this is chronic finding. After reviewing the out-pt records, the pt is being followed by Dr. Brown for similar findings over the past x 1-2 years. He has an ICD in place. There is a question of medication compliance. However, he is also prescribed a BB.   -EP eval for device interrogation.   -Multiple EKGs w/ sinus bradycardia  -Serial cardiac enzymes     Magnesium deficiency. Replace. No QTc prolongation present.     HX of EtOH abuse and HLD. Restart home meds, except as stated above. DVT PPX. Inform PCP of pt's admission to hospital. My note supersedes the residents note.     Spoke with family: Unable to reach family members    Date seen by Attendin23

## 2023-01-08 NOTE — ED ADULT TRIAGE NOTE - CHIEF COMPLAINT QUOTE
Patient sent in from urgent care for lightheadedness and dizziness with low heart rate. Patient has defibrillator, patient denies any shocks.

## 2023-01-08 NOTE — ED PROVIDER NOTE - CLINICAL SUMMARY MEDICAL DECISION MAKING FREE TEXT BOX
82-year-old male past medical history of defibrillator followed by Dr. Mathias hypertension hyperlipidemia presents with malaise fatigue subjective fever went to urgent care for flu testing was bradycardic in the 40s sent to the ED no fever chills no headache no dizziness no chest pain palpitation shortness of breath no abdominal pain no bloody stool.    Well appearing, NAD, non toxic. NCAT PERRLA EOMI neck supple non tender normal wob cta bl rrr abdomen s nt nd no rebound no guarding WWPx4 neuro non focal    Patient will be admitted previous records reviewed labs EKG reviewed chest x-ray reviewed discussed plan with family additional history acquired by family will admit to telemetry for EP defibrillator review. considered admission

## 2023-01-08 NOTE — H&P ADULT - NSHPLABSRESULTS_GEN_ALL_CORE
LABS:                          13.8   6.93  )-----------( 296      ( 08 Jan 2023 16:48 )             41.4     01-08    143  |  107  |  20  ----------------------------<  101<H>  4.0   |  27  |  1.4    Ca    9.3      08 Jan 2023 16:48  Mg     1.7     01-08    TPro  6.0  /  Alb  4.5  /  TBili  0.9  /  DBili  x   /  AST  11  /  ALT  16  /  AlkPhos  84  01-08

## 2023-01-08 NOTE — H&P ADULT - ASSESSMENT
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 Pt went to  and was found to be alva in the 40s and was sent to the ED.    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 Pt went to  and was found to be alva in the 40s and was sent to the ED.    #Symptomatic Bradycardia  #dilated cardiomyopathy suspected due to excessive alcohol use s/p ICD  #HTN  #HLD  - consult EP for upgrade of AICD  - currently asymptomatic, but has been having bouts of dizziness and lightheadness per the daughter  - holding amio and metoprolol  - fup echo  - cw losartan, atorvastatin  - fup TSH, A1C, lipid    #Hx of Dementia  - baseline AAOx2  - flight risk  - currently on one to one  - daughter is aware and understands the situation  - continue with duloxetine    #CKD 3  - trend cr    DVT ppx: lovenox  ppi  ambulate as tolerated  full code  DASH/ TLC

## 2023-01-09 LAB
A1C WITH ESTIMATED AVERAGE GLUCOSE RESULT: 5.6 % — SIGNIFICANT CHANGE UP (ref 4–5.6)
ALBUMIN SERPL ELPH-MCNC: 4.1 G/DL — SIGNIFICANT CHANGE UP (ref 3.5–5.2)
ALP SERPL-CCNC: 88 U/L — SIGNIFICANT CHANGE UP (ref 30–115)
ALT FLD-CCNC: 15 U/L — SIGNIFICANT CHANGE UP (ref 0–41)
ANION GAP SERPL CALC-SCNC: 11 MMOL/L — SIGNIFICANT CHANGE UP (ref 7–14)
AST SERPL-CCNC: 9 U/L — SIGNIFICANT CHANGE UP (ref 0–41)
BASOPHILS # BLD AUTO: 0.05 K/UL — SIGNIFICANT CHANGE UP (ref 0–0.2)
BASOPHILS NFR BLD AUTO: 0.7 % — SIGNIFICANT CHANGE UP (ref 0–1)
BILIRUB SERPL-MCNC: 0.6 MG/DL — SIGNIFICANT CHANGE UP (ref 0.2–1.2)
BUN SERPL-MCNC: 21 MG/DL — HIGH (ref 10–20)
CALCIUM SERPL-MCNC: 9.5 MG/DL — SIGNIFICANT CHANGE UP (ref 8.4–10.5)
CHLORIDE SERPL-SCNC: 108 MMOL/L — SIGNIFICANT CHANGE UP (ref 98–110)
CHOLEST SERPL-MCNC: 152 MG/DL — SIGNIFICANT CHANGE UP
CO2 SERPL-SCNC: 23 MMOL/L — SIGNIFICANT CHANGE UP (ref 17–32)
CREAT SERPL-MCNC: 1.3 MG/DL — SIGNIFICANT CHANGE UP (ref 0.7–1.5)
EGFR: 56 ML/MIN/1.73M2 — LOW
EOSINOPHIL # BLD AUTO: 0.18 K/UL — SIGNIFICANT CHANGE UP (ref 0–0.7)
EOSINOPHIL NFR BLD AUTO: 2.4 % — SIGNIFICANT CHANGE UP (ref 0–8)
ESTIMATED AVERAGE GLUCOSE: 114 MG/DL — SIGNIFICANT CHANGE UP (ref 68–114)
FOLATE SERPL-MCNC: 7.6 NG/ML — SIGNIFICANT CHANGE UP
GLUCOSE SERPL-MCNC: 90 MG/DL — SIGNIFICANT CHANGE UP (ref 70–99)
HCT VFR BLD CALC: 39.2 % — LOW (ref 42–52)
HDLC SERPL-MCNC: 38 MG/DL — LOW
HGB BLD-MCNC: 13.4 G/DL — LOW (ref 14–18)
IMM GRANULOCYTES NFR BLD AUTO: 0.4 % — HIGH (ref 0.1–0.3)
LIPID PNL WITH DIRECT LDL SERPL: 85 MG/DL — SIGNIFICANT CHANGE UP
LYMPHOCYTES # BLD AUTO: 1.85 K/UL — SIGNIFICANT CHANGE UP (ref 1.2–3.4)
LYMPHOCYTES # BLD AUTO: 24.7 % — SIGNIFICANT CHANGE UP (ref 20.5–51.1)
MAGNESIUM SERPL-MCNC: 2 MG/DL — SIGNIFICANT CHANGE UP (ref 1.8–2.4)
MCHC RBC-ENTMCNC: 29.8 PG — SIGNIFICANT CHANGE UP (ref 27–31)
MCHC RBC-ENTMCNC: 34.2 G/DL — SIGNIFICANT CHANGE UP (ref 32–37)
MCV RBC AUTO: 87.1 FL — SIGNIFICANT CHANGE UP (ref 80–94)
MONOCYTES # BLD AUTO: 0.75 K/UL — HIGH (ref 0.1–0.6)
MONOCYTES NFR BLD AUTO: 10 % — HIGH (ref 1.7–9.3)
NEUTROPHILS # BLD AUTO: 4.62 K/UL — SIGNIFICANT CHANGE UP (ref 1.4–6.5)
NEUTROPHILS NFR BLD AUTO: 61.8 % — SIGNIFICANT CHANGE UP (ref 42.2–75.2)
NON HDL CHOLESTEROL: 114 MG/DL — SIGNIFICANT CHANGE UP
NRBC # BLD: 0 /100 WBCS — SIGNIFICANT CHANGE UP (ref 0–0)
PLATELET # BLD AUTO: 272 K/UL — SIGNIFICANT CHANGE UP (ref 130–400)
POTASSIUM SERPL-MCNC: 4 MMOL/L — SIGNIFICANT CHANGE UP (ref 3.5–5)
POTASSIUM SERPL-SCNC: 4 MMOL/L — SIGNIFICANT CHANGE UP (ref 3.5–5)
PROT SERPL-MCNC: 5.5 G/DL — LOW (ref 6–8)
RBC # BLD: 4.5 M/UL — LOW (ref 4.7–6.1)
RBC # FLD: 14.3 % — SIGNIFICANT CHANGE UP (ref 11.5–14.5)
SODIUM SERPL-SCNC: 142 MMOL/L — SIGNIFICANT CHANGE UP (ref 135–146)
TRIGL SERPL-MCNC: 142 MG/DL — SIGNIFICANT CHANGE UP
TROPONIN T SERPL-MCNC: <0.01 NG/ML — SIGNIFICANT CHANGE UP
TSH SERPL-MCNC: 2.9 UIU/ML — SIGNIFICANT CHANGE UP (ref 0.27–4.2)
VIT B12 SERPL-MCNC: 382 PG/ML — SIGNIFICANT CHANGE UP (ref 232–1245)
WBC # BLD: 7.48 K/UL — SIGNIFICANT CHANGE UP (ref 4.8–10.8)
WBC # FLD AUTO: 7.48 K/UL — SIGNIFICANT CHANGE UP (ref 4.8–10.8)

## 2023-01-09 PROCEDURE — 93282 PRGRMG EVAL IMPLANTABLE DFB: CPT | Mod: 26

## 2023-01-09 PROCEDURE — 93306 TTE W/DOPPLER COMPLETE: CPT | Mod: 26

## 2023-01-09 PROCEDURE — 99223 1ST HOSP IP/OBS HIGH 75: CPT

## 2023-01-09 RX ORDER — ENOXAPARIN SODIUM 100 MG/ML
40 INJECTION SUBCUTANEOUS EVERY 24 HOURS
Refills: 0 | Status: DISCONTINUED | OUTPATIENT
Start: 2023-01-09 | End: 2023-01-11

## 2023-01-09 RX ORDER — ATORVASTATIN CALCIUM 80 MG/1
80 TABLET, FILM COATED ORAL AT BEDTIME
Refills: 0 | Status: DISCONTINUED | OUTPATIENT
Start: 2023-01-09 | End: 2023-01-11

## 2023-01-09 RX ORDER — HALOPERIDOL DECANOATE 100 MG/ML
2 INJECTION INTRAMUSCULAR ONCE
Refills: 0 | Status: COMPLETED | OUTPATIENT
Start: 2023-01-09 | End: 2023-01-09

## 2023-01-09 RX ORDER — ACETAMINOPHEN 500 MG
650 TABLET ORAL EVERY 6 HOURS
Refills: 0 | Status: DISCONTINUED | OUTPATIENT
Start: 2023-01-09 | End: 2023-01-11

## 2023-01-09 RX ORDER — PANTOPRAZOLE SODIUM 20 MG/1
40 TABLET, DELAYED RELEASE ORAL
Refills: 0 | Status: DISCONTINUED | OUTPATIENT
Start: 2023-01-09 | End: 2023-01-11

## 2023-01-09 RX ORDER — DULOXETINE HYDROCHLORIDE 30 MG/1
60 CAPSULE, DELAYED RELEASE ORAL DAILY
Refills: 0 | Status: DISCONTINUED | OUTPATIENT
Start: 2023-01-09 | End: 2023-01-11

## 2023-01-09 RX ADMIN — DULOXETINE HYDROCHLORIDE 60 MILLIGRAM(S): 30 CAPSULE, DELAYED RELEASE ORAL at 14:22

## 2023-01-09 RX ADMIN — ENOXAPARIN SODIUM 40 MILLIGRAM(S): 100 INJECTION SUBCUTANEOUS at 21:03

## 2023-01-09 RX ADMIN — PANTOPRAZOLE SODIUM 40 MILLIGRAM(S): 20 TABLET, DELAYED RELEASE ORAL at 09:19

## 2023-01-09 RX ADMIN — HALOPERIDOL DECANOATE 2 MILLIGRAM(S): 100 INJECTION INTRAMUSCULAR at 21:03

## 2023-01-09 RX ADMIN — ATORVASTATIN CALCIUM 80 MILLIGRAM(S): 80 TABLET, FILM COATED ORAL at 21:02

## 2023-01-09 RX ADMIN — Medication 1 GRAM(S): at 01:51

## 2023-01-09 NOTE — CHART NOTE - NSCHARTNOTEFT_GEN_A_CORE
Electrophysiology    Pts device __single lead ICD (Biotronic)____ was interrogated on 01-09-23  Device working properly  Events: VF 10/22/22 treated with ATP  Pt is __NOT___ pacemaker dependent   AP n/a   % /   0  %  AT/AF burden  n/a   %  Underlying rhythm nsr with pvc  Mode VVI @40bpm  --> reprogramed VVI @50bpm  Battery 4% (not at MOON)    Preliminary results d/w with primary team  Reviewed by attending  Follow up with Dr Brown as scheduled    Contact EP ACP with any questions [phone]

## 2023-01-10 LAB
ALBUMIN SERPL ELPH-MCNC: 4.2 G/DL — SIGNIFICANT CHANGE UP (ref 3.5–5.2)
ALP SERPL-CCNC: 89 U/L — SIGNIFICANT CHANGE UP (ref 30–115)
ALT FLD-CCNC: 15 U/L — SIGNIFICANT CHANGE UP (ref 0–41)
ANION GAP SERPL CALC-SCNC: 12 MMOL/L — SIGNIFICANT CHANGE UP (ref 7–14)
AST SERPL-CCNC: 17 U/L — SIGNIFICANT CHANGE UP (ref 0–41)
BASOPHILS # BLD AUTO: 0.07 K/UL — SIGNIFICANT CHANGE UP (ref 0–0.2)
BASOPHILS NFR BLD AUTO: 0.9 % — SIGNIFICANT CHANGE UP (ref 0–1)
BILIRUB SERPL-MCNC: 0.8 MG/DL — SIGNIFICANT CHANGE UP (ref 0.2–1.2)
BUN SERPL-MCNC: 19 MG/DL — SIGNIFICANT CHANGE UP (ref 10–20)
CALCIUM SERPL-MCNC: 9.5 MG/DL — SIGNIFICANT CHANGE UP (ref 8.4–10.5)
CHLORIDE SERPL-SCNC: 108 MMOL/L — SIGNIFICANT CHANGE UP (ref 98–110)
CO2 SERPL-SCNC: 19 MMOL/L — SIGNIFICANT CHANGE UP (ref 17–32)
CREAT SERPL-MCNC: 1.1 MG/DL — SIGNIFICANT CHANGE UP (ref 0.7–1.5)
EGFR: 68 ML/MIN/1.73M2 — SIGNIFICANT CHANGE UP
EOSINOPHIL # BLD AUTO: 0.13 K/UL — SIGNIFICANT CHANGE UP (ref 0–0.7)
EOSINOPHIL NFR BLD AUTO: 1.7 % — SIGNIFICANT CHANGE UP (ref 0–8)
GLUCOSE BLDC GLUCOMTR-MCNC: 88 MG/DL — SIGNIFICANT CHANGE UP (ref 70–99)
GLUCOSE SERPL-MCNC: 90 MG/DL — SIGNIFICANT CHANGE UP (ref 70–99)
HCT VFR BLD CALC: 42.3 % — SIGNIFICANT CHANGE UP (ref 42–52)
HGB BLD-MCNC: 14.2 G/DL — SIGNIFICANT CHANGE UP (ref 14–18)
IMM GRANULOCYTES NFR BLD AUTO: 0.3 % — SIGNIFICANT CHANGE UP (ref 0.1–0.3)
LYMPHOCYTES # BLD AUTO: 1.84 K/UL — SIGNIFICANT CHANGE UP (ref 1.2–3.4)
LYMPHOCYTES # BLD AUTO: 23.7 % — SIGNIFICANT CHANGE UP (ref 20.5–51.1)
MAGNESIUM SERPL-MCNC: 1.8 MG/DL — SIGNIFICANT CHANGE UP (ref 1.8–2.4)
MCHC RBC-ENTMCNC: 29.3 PG — SIGNIFICANT CHANGE UP (ref 27–31)
MCHC RBC-ENTMCNC: 33.6 G/DL — SIGNIFICANT CHANGE UP (ref 32–37)
MCV RBC AUTO: 87.4 FL — SIGNIFICANT CHANGE UP (ref 80–94)
MONOCYTES # BLD AUTO: 0.68 K/UL — HIGH (ref 0.1–0.6)
MONOCYTES NFR BLD AUTO: 8.8 % — SIGNIFICANT CHANGE UP (ref 1.7–9.3)
NEUTROPHILS # BLD AUTO: 5.03 K/UL — SIGNIFICANT CHANGE UP (ref 1.4–6.5)
NEUTROPHILS NFR BLD AUTO: 64.6 % — SIGNIFICANT CHANGE UP (ref 42.2–75.2)
NRBC # BLD: 0 /100 WBCS — SIGNIFICANT CHANGE UP (ref 0–0)
PLATELET # BLD AUTO: 264 K/UL — SIGNIFICANT CHANGE UP (ref 130–400)
POTASSIUM SERPL-MCNC: 4.6 MMOL/L — SIGNIFICANT CHANGE UP (ref 3.5–5)
POTASSIUM SERPL-SCNC: 4.6 MMOL/L — SIGNIFICANT CHANGE UP (ref 3.5–5)
PROT SERPL-MCNC: 5.8 G/DL — LOW (ref 6–8)
RBC # BLD: 4.84 M/UL — SIGNIFICANT CHANGE UP (ref 4.7–6.1)
RBC # FLD: 14 % — SIGNIFICANT CHANGE UP (ref 11.5–14.5)
SODIUM SERPL-SCNC: 139 MMOL/L — SIGNIFICANT CHANGE UP (ref 135–146)
WBC # BLD: 7.77 K/UL — SIGNIFICANT CHANGE UP (ref 4.8–10.8)
WBC # FLD AUTO: 7.77 K/UL — SIGNIFICANT CHANGE UP (ref 4.8–10.8)

## 2023-01-10 PROCEDURE — 93010 ELECTROCARDIOGRAM REPORT: CPT

## 2023-01-10 RX ORDER — HALOPERIDOL DECANOATE 100 MG/ML
2 INJECTION INTRAMUSCULAR ONCE
Refills: 0 | Status: COMPLETED | OUTPATIENT
Start: 2023-01-10 | End: 2023-01-10

## 2023-01-10 RX ADMIN — Medication 2 MILLIGRAM(S): at 02:30

## 2023-01-10 RX ADMIN — Medication 2 MILLIGRAM(S): at 11:05

## 2023-01-10 RX ADMIN — HALOPERIDOL DECANOATE 2 MILLIGRAM(S): 100 INJECTION INTRAMUSCULAR at 02:20

## 2023-01-10 RX ADMIN — ENOXAPARIN SODIUM 40 MILLIGRAM(S): 100 INJECTION SUBCUTANEOUS at 21:28

## 2023-01-10 RX ADMIN — ATORVASTATIN CALCIUM 80 MILLIGRAM(S): 80 TABLET, FILM COATED ORAL at 21:29

## 2023-01-10 RX ADMIN — DULOXETINE HYDROCHLORIDE 60 MILLIGRAM(S): 30 CAPSULE, DELAYED RELEASE ORAL at 11:06

## 2023-01-10 NOTE — CONSULT NOTE ADULT - SUBJECTIVE AND OBJECTIVE BOX
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 Pt went to  and was found to be alva in the 40s and was sent to the ED.  spoke to Daughter Mary Lou on the phone. She said he lives alone and refuses any help after her trying multiple times. Has progressive dementia and wanders all by himself sometimes and went to the  himself.  wanted to send him to the hospital but pt refused, daughter was called and she had to convince him to sit in the ambulance. EP is aware that pt needs pacemaker but pt has refused in the past per daughter. Daughter does not even know if he takes his medications properly, pt contiues to refuse help.    ED vitals:   / 72, HR 48, RR 18, Temp 98, O2 sat 95% on RA     Sig labs:    otherwise unremarkable    EKG: looks like 1st degree AV block with junctional escape rhythm and PVCs?    XRAY: unremarkable    Pt was trying to leave AMA, was given haldol and ativan in the ED        Medical charts / labs / imaging studies reviewed       PAST MEDICAL & SURGICAL HISTORY:  Hypertension      High cholesterol          Hospital Course:    TODAY'S SUBJECTIVE & REVIEW OF SYMPTOMS:     Constitutional WNL   Cardio WNL   Resp WNL   GI WNL  Heme WNL  Endo WNL  Skin WNL  MSK Weakness  Neuro WNL  Cognitive WNL  Psych WNL      MEDICATIONS  (STANDING):  atorvastatin 80 milliGRAM(s) Oral at bedtime  DULoxetine 60 milliGRAM(s) Oral daily  enoxaparin Injectable 40 milliGRAM(s) SubCutaneous every 24 hours  pantoprazole    Tablet 40 milliGRAM(s) Oral before breakfast    MEDICATIONS  (PRN):  acetaminophen     Tablet .. 650 milliGRAM(s) Oral every 6 hours PRN Temp greater or equal to 38C (100.4F), Mild Pain (1 - 3)      FAMILY HISTORY:      Allergies    No Known Allergies    Intolerances        SOCIAL HISTORY:    [  ] Etoh  [  ] Smoking  [  ] Substance abuse     Home Environment:  [x   ] Home Alone  [   ] Lives with Family  [   ] Home Health Aid    Dwelling:  [   ] Apartment  [ x  ] Private House  [   ] Adult Home  [   ] Skilled Nursing Facility      [   ] Short Term  [   ] Long Term  [ x  ] Stairs       Elevator [   ]    FUNCTIONAL STATUS PTA: (Check all that apply)  Ambulation: [ x   ]Independent    [   ] Dependent     [   ] Non-Ambulatory  Assistive Device: [   ] SA Cane  [   ]  Q Cane  [   ] Walker  [   ]  Wheelchair  ADL : [  x ] Independent  [    ]  Dependent       Vital Signs Last 24 Hrs  T(C): 36.6 (10 Agustin 2023 15:26), Max: 36.6 (10 Agustin 2023 07:15)  T(F): 97.9 (10 Agustin 2023 15:26), Max: 97.9 (10 Agustin 2023 15:26)  HR: 68 (10 Agustin 2023 15:26) (68 - 81)  BP: 130/71 (10 Agustin 2023 15:26) (120/62 - 135/96)  BP(mean): --  RR: 18 (10 Agustin 2023 15:26) (18 - 18)  SpO2: 99% (10 Agustin 2023 15:26) (99% - 99%)    Parameters below as of 10 Agustin 2023 15:26  Patient On (Oxygen Delivery Method): room air          PHYSICAL EXAM: Awake & Alert  GENERAL: NAD  HEAD:  Normocephalic  CHEST/LUNG: Clear   HEART: S1S2+  ABDOMEN: Soft, Nontender  EXTREMITIES:  no calf tenderness    NERVOUS SYSTEM:  Cranial Nerves 2-12 intact [   ] Abnormal  [   ]  ROM: WFL all extremities [   ]  Abnormal [   ]able to move all ext - limited cooperation   Motor Strength: WFL all extremities  [   ]  Abnormal [   ]  Sensation: intact to light touch [ x  ] Abnormal [   ]    FUNCTIONAL STATUS:  Bed Mobility: Independent [   ]  Supervision [   ]  Needs Assistance [ x  ]  N/A [   ]  Transfers: Independent [   ]  Supervision [   ]  Needs Assistance [   ]  N/A [   ]   Ambulation: Independent [   ]  Supervision [   ]  Needs Assistance [   ]  N/A [   ]  ADL: Independent [   ] Requires Assistance [   ] N/A [   ]      LABS:                        13.4   7.48  )-----------( 272      ( 09 Jan 2023 07:14 )             39.2     01-09    142  |  108  |  21<H>  ----------------------------<  90  4.0   |  23  |  1.3    Ca    9.5      09 Jan 2023 07:14  Mg     2.0     01-09    TPro  5.5<L>  /  Alb  4.1  /  TBili  0.6  /  DBili  x   /  AST  9   /  ALT  15  /  AlkPhos  88  01-09          RADIOLOGY & ADDITIONAL STUDIES:  
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 Pt went to  and was found to be alva in the 40s and was sent to the ED.  Per chart review, daughter reported that he lives alone and refuses any help after her trying multiple times. Has progressive dementia and wanders all by himself sometimes and went to the  himself.  wanted to send him to the hospital but pt refused, daughter was called and she had to convince him to sit in the ambulance. EP is aware that pt needs pacemaker but pt has refused in the past per daughter. Daughter does not even know if he takes his medications properly.    PAST MEDICAL & SURGICAL HISTORY  Hypertension    High cholesterol        FAMILY HISTORY:  FAMILY HISTORY:      SOCIAL HISTORY:  []smoker  []Alcohol  []Drug    ROS:  Negative except as mentioned in HPI    ALLERGIES:  No Known Allergies      MEDICATIONS:  MEDICATIONS  (STANDING):  atorvastatin 80 milliGRAM(s) Oral at bedtime  DULoxetine 60 milliGRAM(s) Oral daily  enoxaparin Injectable 40 milliGRAM(s) SubCutaneous every 24 hours  pantoprazole    Tablet 40 milliGRAM(s) Oral before breakfast    MEDICATIONS  (PRN):  acetaminophen     Tablet .. 650 milliGRAM(s) Oral every 6 hours PRN Temp greater or equal to 38C (100.4F), Mild Pain (1 - 3)      HOME MEDICATIONS:  Home Medications:  amiodarone 100 mg oral tablet: 0.5 tab(s) orally once a day (09 Jan 2023 01:43)  atorvastatin 80 mg oral tablet: 1 tab(s) orally once a day (09 Jan 2023 01:43)  DULoxetine 60 mg oral delayed release capsule: 1 cap(s) orally once a day (09 Jan 2023 01:43)  metoprolol succinate 50 mg oral tablet, extended release: 1 tab(s) orally once a day (09 Jan 2023 01:43)      VITALS:   T(F): 96.7 (01-09 @ 07:30), Max: 98 (01-08 @ 15:44)  HR: 56 (01-09 @ 07:30) (48 - 67)  BP: 130/79 (01-09 @ 07:30) (114/70 - 161/72)  BP(mean): --  RR: 16 (01-09 @ 07:30) (16 - 18)  SpO2: 99% (01-09 @ 07:30) (95% - 99%)    I&O's Summary      PHYSICAL EXAM:  GEN: Not in acute distress  HEENT: NCAT, PERRL, EOMI  LUNGS: Clear to auscultation bilaterally   CARDIOVASCULAR: RRR, S1/S2 present, no murmurs, rubs or gallops, no JVD, + PP bilaterally  ABD: Soft, non-tender, non-distended  EXT: No AUGUSTA  SKIN: Intact  NEURO: AAOx3    LABS:                        13.4   7.48  )-----------( 272      ( 09 Jan 2023 07:14 )             39.2     01-08    143  |  107  |  20  ----------------------------<  101<H>  4.0   |  27  |  1.4    Ca    9.3      08 Jan 2023 16:48  Mg     1.7     01-08    TPro  6.0  /  Alb  4.5  /  TBili  0.9  /  DBili  x   /  AST  11  /  ALT  16  /  AlkPhos  84  01-08      Troponin T, Serum: <0.01 ng/mL (01-09-23 @ 07:14)  Troponin T, Serum: <0.01 ng/mL (01-08-23 @ 22:19)  Troponin T, Serum: <0.01 ng/mL (01-08-23 @ 16:48)    Troponin <0.01, CKMB --, CK --/ 01-09-23 @ 07:14  Troponin <0.01, CKMB --, CK --/ 01-08-23 @ 22:19  Troponin <0.01, CKMB --, CK --/ 01-08-23 @ 16:48    Serum Pro-Brain Natriuretic Peptide: 523 pg/mL (01-08-23 @ 16:48)    EKG: NSR, PVC HR 66

## 2023-01-10 NOTE — CONSULT NOTE ADULT - ASSESSMENT
IMPRESSION: Rehab of gait dysfunction / bradycardia / dementia / cardiomyopathy     PRECAUTIONS: [   ] Cardiac  [   ] Respiratory  [   ] Seizures [   ] Contact Isolation  [   ] Droplet Isolation  [   ] Other    Weight Bearing Status:     RECOMMENDATION:     Out of Bed to Chair     DVT/Decubiti Prophylaxis    REHAB PLAN:     [  x  ] Bedside P/T 3-5 times a week   [    ]   Bedside O/T  2-3 times a week             [    ] Speech Therapy               [    ]  No Rehab Therapy Indicated   Conditioning/ROM                                    ADL  Bed Mobility                                               Conditioning/ROM  Transfers                                                     Bed Mobility  Sitting /Standing Balance                         Transfers                                        Gait Training                                               Sitting/Standing Balance  Stair Training [   ]Applicable                    Home equipment Eval                                                                        Splinting  [   ] Only      GOALS:   ADL   [    ]   Independent                    Transfers  [ x   ] Independent                          Ambulation  [  x  ] Independent     [  x   ] With device                            [    ]  CG                                                         [    ]  CG                                                                  [    ] CG                            [    ] Min A                                                   [    ] Min A                                                              [    ] Min  A          DISCHARGE PLAN:   [    ]  Good candidate for Intensive Rehabilitation/Hospital based                                             Will tolerate 3hrs Intensive Rehab Daily                                       [  x   ]  Short Term Rehab in Skilled Nursing Facility                          vs             [  x   ]  Home with Outpatient or VN services                                         [     ]  Possible Candidate for Intensive Hospital based Rehab                                       
Bradycardia  Dilated CM    - Check TSH level.  - Avoid AVNB.  - Will interrogate device to evaluate for bradycardia incident.

## 2023-01-11 ENCOUNTER — TRANSCRIPTION ENCOUNTER (OUTPATIENT)
Age: 83
End: 2023-01-11

## 2023-01-11 VITALS
RESPIRATION RATE: 18 BRPM | SYSTOLIC BLOOD PRESSURE: 132 MMHG | OXYGEN SATURATION: 98 % | DIASTOLIC BLOOD PRESSURE: 76 MMHG | HEART RATE: 70 BPM

## 2023-01-11 RX ORDER — MAGNESIUM SULFATE 500 MG/ML
1 VIAL (ML) INJECTION
Refills: 0 | Status: COMPLETED | OUTPATIENT
Start: 2023-01-11 | End: 2023-01-11

## 2023-01-11 RX ADMIN — Medication 25 GRAM(S): at 11:56

## 2023-01-11 RX ADMIN — Medication 25 GRAM(S): at 09:25

## 2023-01-11 NOTE — DISCHARGE NOTE NURSING/CASE MANAGEMENT/SOCIAL WORK - NSDCPEFALRISK_GEN_ALL_CORE
For information on Fall & Injury Prevention, visit: [email or web address] OR  [email or web address] OR  https://www.cdc.gov/steadi/patient.html

## 2023-01-11 NOTE — DISCHARGE NOTE PROVIDER - NSDCFUSCHEDAPPT_GEN_ALL_CORE_FT
[facility]  CARDIOLOGY [address]  Scheduled Appointment: 02/15/2023    Vinod Brown  [facility]  CARDIOLOGY [address]  Scheduled Appointment: 02/22/2023

## 2023-01-11 NOTE — PROGRESS NOTE ADULT - SUBJECTIVE AND OBJECTIVE BOX
Patient is a 82y old  Male who presents with a chief complaint of       Over Night Events:  Patient denies any complaints. Still requiring 1:1 sit and is an elopement risk.       ROS:     All pertinent ROS are negative except HPI         PHYSICAL EXAM    ICU Vital Signs Last 24 Hrs  T(C): 36.6 (10 Agustin 2023 07:15), Max: 36.6 (10 Agustin 2023 07:15)  T(F): 97.8 (10 Agustin 2023 07:15), Max: 97.8 (10 Agustin 2023 07:15)  HR: 70 (10 Agustin 2023 07:15) (65 - 81)  BP: 120/62 (10 Agustin 2023 07:15) (118/70 - 135/96)  RR: 18 (10 Agustin 2023 07:15) (18 - 18)  SpO2: 99% (10 Agustin 2023 07:15) (99% - 100%)    O2 Parameters below as of 10 Agustin 2023 05:26  Patient On (Oxygen Delivery Method): room air        CONSTITUTIONAL:   Ill appearing.  NAD    ENT:   Airway patent,   Mouth with normal mucosa.   No thrush    EYES:   Pupils equal,   Round and reactive to light.    CARDIAC:   Normal rate,   Regular rhythm.    No edema    RESPIRATORY:   No wheezing  Bilateral BS  Normal chest expansion  Not tachypneic,  No use of accessory muscles    GASTROINTESTINAL:  Abdomen soft,   Non-tender,   No guarding,   + BS    MUSCULOSKELETAL:   Range of motion is not limited,  No clubbing, cyanosis    NEUROLOGICAL:   Confused    SKIN:   Skin normal color for race,   Warm and dry  No evidence of rash.    PSYCHIATRIC:    No apparent risk to self or others.        LABS:                            13.4   7.48  )-----------( 272      ( 09 Jan 2023 07:14 )             39.2                                               01-09    142  |  108  |  21<H>  ----------------------------<  90  4.0   |  23  |  1.3    Ca    9.5      09 Jan 2023 07:14  Mg     2.0     01-09    TPro  5.5<L>  /  Alb  4.1  /  TBili  0.6  /  DBili  x   /  AST  9   /  ALT  15  /  AlkPhos  88  01-09                                CARDIAC MARKERS ( 09 Jan 2023 07:14 )  x     / <0.01 ng/mL / x     / x     / x      CARDIAC MARKERS ( 08 Jan 2023 22:19 )  x     / <0.01 ng/mL / x     / x     / x      CARDIAC MARKERS ( 08 Jan 2023 16:48 )  x     / <0.01 ng/mL / x     / x     / x                                                LIVER FUNCTIONS - ( 09 Jan 2023 07:14 )  Alb: 4.1 g/dL / Pro: 5.5 g/dL / ALK PHOS: 88 U/L / ALT: 15 U/L / AST: 9 U/L / GGT: x                                                                                      MEDICATIONS  (STANDING):  atorvastatin 80 milliGRAM(s) Oral at bedtime  DULoxetine 60 milliGRAM(s) Oral daily  enoxaparin Injectable 40 milliGRAM(s) SubCutaneous every 24 hours  pantoprazole    Tablet 40 milliGRAM(s) Oral before breakfast    MEDICATIONS  (PRN):  acetaminophen     Tablet .. 650 milliGRAM(s) Oral every 6 hours PRN Temp greater or equal to 38C (100.4F), Mild Pain (1 - 3)  
Patient is a 82y old  Male who presents with a chief complaint of         PHYSICAL EXAM    ICU Vital Signs Last 24 Hrs  T(C): 35.9 (09 Jan 2023 07:30), Max: 36.7 (08 Jan 2023 15:44)  T(F): 96.7 (09 Jan 2023 07:30), Max: 98 (08 Jan 2023 15:44)  HR: 56 (09 Jan 2023 07:30) (48 - 67)  BP: 130/79 (09 Jan 2023 07:30) (114/70 - 161/72)  RR: 16 (09 Jan 2023 07:30) (16 - 18)  SpO2: 99% (09 Jan 2023 07:30) (95% - 99%)    O2 Parameters below as of 09 Jan 2023 07:30  Patient On (Oxygen Delivery Method): room air            CONSTITUTIONAL:   Ill appearing.  NAD    ENT:   Airway patent,   Mouth with normal mucosa.   No thrush    EYES:   Pupils equal,   Round and reactive to light.    CARDIAC:   Normal rate,   Regular rhythm.    No edema    RESPIRATORY:   No wheezing  Bilateral BS  Normal chest expansion  Not tachypneic,  No use of accessory muscles    GASTROINTESTINAL:  Abdomen soft,   Non-tender,   No guarding,   + BS    MUSCULOSKELETAL:   Range of motion is not limited,  No clubbing, cyanosis    NEUROLOGICAL:   Confused    SKIN:   Skin normal color for race,   Warm and dry  No evidence of rash.    PSYCHIATRIC:   Normal mood and affect.   No apparent risk to self or others.        LABS:                            13.4   7.48  )-----------( 272      ( 09 Jan 2023 07:14 )             39.2                                               01-09    142  |  108  |  21<H>  ----------------------------<  90  4.0   |  23  |  1.3    Ca    9.5      09 Jan 2023 07:14  Mg     2.0     01-09    TPro  5.5<L>  /  Alb  4.1  /  TBili  0.6  /  DBili  x   /  AST  9   /  ALT  15  /  AlkPhos  88  01-09                                                 CARDIAC MARKERS ( 09 Jan 2023 07:14 )  x     / <0.01 ng/mL / x     / x     / x      CARDIAC MARKERS ( 08 Jan 2023 22:19 )  x     / <0.01 ng/mL / x     / x     / x      CARDIAC MARKERS ( 08 Jan 2023 16:48 )  x     / <0.01 ng/mL / x     / x     / x                                                LIVER FUNCTIONS - ( 09 Jan 2023 07:14 )  Alb: 4.1 g/dL / Pro: 5.5 g/dL / ALK PHOS: 88 U/L / ALT: 15 U/L / AST: 9 U/L / GGT: x                                                                             MEDICATIONS  (STANDING):  atorvastatin 80 milliGRAM(s) Oral at bedtime  DULoxetine 60 milliGRAM(s) Oral daily  enoxaparin Injectable 40 milliGRAM(s) SubCutaneous every 24 hours  pantoprazole    Tablet 40 milliGRAM(s) Oral before breakfast    MEDICATIONS  (PRN):  acetaminophen     Tablet .. 650 milliGRAM(s) Oral every 6 hours PRN Temp greater or equal to 38C (100.4F), Mild Pain (1 - 3)  
Patient is a 82y old  Male who presents with a chief complaint of       Over Night Events:  Daughter at bedside. Patient denies any complaints and family member states patient is back to baseline and would like to go home, disagreeable to SNF. Still has 1:1 sit.       ROS:     All pertinent ROS are negative except HPI         PHYSICAL EXAM    ICU Vital Signs Last 24 Hrs  T(C): 36.2 (11 Jan 2023 07:27), Max: 36.6 (10 Agustin 2023 15:26)  T(F): 97.2 (11 Jan 2023 07:27), Max: 97.9 (10 Agustin 2023 15:26)  HR: 79 (11 Jan 2023 07:27) (68 - 79)  BP: 131/86 (11 Jan 2023 07:27) (130/71 - 140/77)  BP(mean): 106 (10 Agustin 2023 20:56) (106 - 106)  RR: 18 (11 Jan 2023 07:27) (18 - 18)  SpO2: 98% (11 Jan 2023 07:27) (98% - 99%)    O2 Parameters below as of 10 Agustin 2023 15:26  Patient On (Oxygen Delivery Method): room air        CONSTITUTIONAL:   Ill appearing.  NAD    ENT:   Airway patent,   Mouth with normal mucosa.   No thrush    EYES:   Pupils equal,   Round and reactive to light.    CARDIAC:   Normal rate,   Regular rhythm.    No edema    RESPIRATORY:   No wheezing  Bilateral BS  Normal chest expansion  Not tachypneic,  No use of accessory muscles    GASTROINTESTINAL:  Abdomen soft,   Non-tender,   No guarding,   + BS    MUSCULOSKELETAL:   Range of motion is not limited,  No clubbing, cyanosis    NEUROLOGICAL:   Confused at times  No motor  deficits.  pertinent DTRs normal    SKIN:   Skin normal color for race,   Warm and dry  No evidence of rash.    PSYCHIATRIC:   No apparent risk to self or others.        LABS:                            14.2   7.77  )-----------( 264      ( 10 Agustin 2023 18:00 )             42.3                                               01-10    139  |  108  |  19  ----------------------------<  90  4.6   |  19  |  1.1    Ca    9.5      10 Agustin 2023 18:00  Mg     1.8     01-10    TPro  5.8<L>  /  Alb  4.2  /  TBili  0.8  /  DBili  x   /  AST  17  /  ALT  15  /  AlkPhos  89  01-10                                                                                          LIVER FUNCTIONS - ( 10 Agustin 2023 18:00 )  Alb: 4.2 g/dL / Pro: 5.8 g/dL / ALK PHOS: 89 U/L / ALT: 15 U/L / AST: 17 U/L / GGT: x                                                                            MEDICATIONS  (STANDING):  atorvastatin 80 milliGRAM(s) Oral at bedtime  DULoxetine 60 milliGRAM(s) Oral daily  enoxaparin Injectable 40 milliGRAM(s) SubCutaneous every 24 hours  magnesium sulfate  IVPB 1 Gram(s) IV Intermittent every 2 hours  pantoprazole    Tablet 40 milliGRAM(s) Oral before breakfast    MEDICATIONS  (PRN):  acetaminophen     Tablet .. 650 milliGRAM(s) Oral every 6 hours PRN Temp greater or equal to 38C (100.4F), Mild Pain (1 - 3)

## 2023-01-11 NOTE — DISCHARGE NOTE PROVIDER - NSDCMRMEDTOKEN_GEN_ALL_CORE_FT
amiodarone 100 mg oral tablet: 0.5 tab(s) orally once a day  atorvastatin 80 mg oral tablet: 1 tab(s) orally once a day  DULoxetine 60 mg oral delayed release capsule: 1 cap(s) orally once a day  metoprolol succinate 50 mg oral tablet, extended release: 1 tab(s) orally once a day

## 2023-01-11 NOTE — PHYSICAL THERAPY INITIAL EVALUATION ADULT - GENERAL OBSERVATIONS, REHAB EVAL
PT IE 11-11:30am. Patient left in supine in NAD. +call bell, +bed alarm, +IV, +telemetry. +Daughter Mary Lou present to observe mobility.

## 2023-01-11 NOTE — DISCHARGE NOTE PROVIDER - EXTENDED VTE YES NO FOR MLM ENOXAPARIN
, Initiate Treatment: Ketoconazole shampoo, clobetasol solution, ketoconazole cream Render In Strict Bullet Format?: No Plan: Follow up in 6-8 weeks Detail Level: Zone Plan: Patient to use over-the-counter moisturizers/emolients. Recommended products that contain ceramides. Specifically suggested CERAVE or EUCERIN Advanced Repair Lotion to be applied liberal onto arms/legs/trunk (but not on face) 1-2 times daily.\\nLIPIKAR AP+ by LaRoche Posey was also recommended\\n\\Vineet discussed the option of prescription AMMONIUM LACTATE 12% lotion to be applied twice daily onto the aforementioned body areas 1-2 times daily.

## 2023-01-11 NOTE — DISCHARGE NOTE NURSING/CASE MANAGEMENT/SOCIAL WORK - PATIENT PORTAL LINK FT
You can access the FollowMyHealth Patient Portal offered by [facility] by registering at the following website: [email or web address]. By joining [facility]’s FollowMyHealth portal, you will also be able to view your health information using other applications (apps) compatible with our system.

## 2023-01-11 NOTE — DISCHARGE NOTE PROVIDER - HOSPITAL COURSE
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 Pt went to  and was found to be alva in the 40s and was sent to the ED. He was evalauted by EP and recommended outpatient follow up as scheduled. He remained asymptomatic from a cardiac standpoint during his hospital course. He had intermittent aggitation due to dementia that resolved with his daughter at bedside. Patient refused discharge to a SNF and was stable for discharge home.  #Symptomatic Bradycardia  #Dilated cardiomyopathy suspected due to excessive alcohol use s/p ICD  -S/p device interrogation by EP (no events), follow up outpatient with Dr. Brown as scheduled outpatient   -ECHO: G1DD    #HTN, HLD  - cw losartan, atorvastatin  #Prolonged QTc  -QTc 498 on EKG, magnesium level WNL    #Hx of Dementia  - baseline AAOx2  - daughter is aware and understands the situation  - continue with duloxetine    #CKD 3    Dispo:   -Patient was evaluated by physiatry and recommended discharge home with with outpatient services or SNF for short term rehab.   -Discussed with patient and daughter, he refuses placement into a SNF and will be discharged home. 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 Pt went to  and was found to be alva in the 40s and was sent to the ED. He was evalauted by EP and recommended outpatient follow up as scheduled. He remained asymptomatic from a cardiac standpoint during his hospital course. He had intermittent aggitation due to dementia that resolved with his daughter at bedside. Patient refused discharge to a SNF and was stable for discharge home.  #Symptomatic Bradycardia  #Dilated cardiomyopathy suspected due to excessive alcohol use s/p ICD  -S/p device interrogation by EP (no events), increased rate to 50, follow up outpatient with Dr. Brown as scheduled outpatient   -as per EP, ok to continue home amiodarone and metoprolol   -ECHO: G1DD    #HTN, HLD  - cw losartan, atorvastatin  #Prolonged QTc  -QTc 498 on EKG, magnesium level WNL    #Hx of Dementia  - baseline AAOx2  - daughter is aware and understands the situation  - continue with duloxetine    #CKD 3    Dispo:   -Patient was evaluated by physiatry and recommended discharge home with with outpatient services or SNF for short term rehab.   -Discussed with patient and daughter, he refuses placement into a SNF and will be discharged home.

## 2023-01-11 NOTE — DISCHARGE NOTE PROVIDER - NSDCCPCAREPLAN_GEN_ALL_CORE_FT
PRINCIPAL DISCHARGE DIAGNOSIS  Diagnosis: Bradycardia  Assessment and Plan of Treatment: Follow up with Dr. Brown as scheduled outpatient.      SECONDARY DISCHARGE DIAGNOSES  Diagnosis: Dementia  Assessment and Plan of Treatment: Continue home medication duloxetine

## 2023-01-11 NOTE — PHYSICAL THERAPY INITIAL EVALUATION ADULT - NSACTIVITYREC_GEN_A_PT
Patient and daughter Mary Lou reported they have good family support from family and friends for all functional tasks at home.

## 2023-01-11 NOTE — PROGRESS NOTE ADULT - ASSESSMENT
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 Pt went to  and was found to be alva in the 40s and was sent to the ED.    #Symptomatic Bradycardia  #Dilated cardiomyopathy suspected due to excessive alcohol use s/p ICD  -EP FU  - holding amio and metoprolol  -avoid AVN blocking agents  -FU ECHO    #HTN, HLD  - cw losartan, atorvastatin    #Hx of Dementia  - baseline AAOx2  - flight risk  - currently on one to one  - daughter is aware and understands the situation  - continue with duloxetine    #CKD 3  - trend cr    DVT ppx: lovenox  ppi  ambulate as tolerated  full code  DASH/ TLC  
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 Pt went to  and was found to be alva in the 40s and was sent to the ED.    #Symptomatic Bradycardia  #Dilated cardiomyopathy suspected due to excessive alcohol use s/p ICD  -EP FU, s/p device interrogation, cleared for DC    #HTN, HLD  - cw losartan, atorvastatin    #Hx of Dementia  - baseline AAOx2  - flight risk  - currently on one to one  - daughter is aware and understands the situation  - continue with duloxetine    # Hypomagnesemia  -replete Mg    #CKD 3  - trend cr    DVT ppx: lovenox  ppi  ambulate as tolerated  full code  DASH/ TLC    Dispo:  -Physiatry/PT eval appreciated  -DC home today    Dr. Fidencio Hebert  Attending Hospitalist   
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 Pt went to  and was found to be alva in the 40s and was sent to the ED.    #Symptomatic Bradycardia  #Dilated cardiomyopathy suspected due to excessive alcohol use s/p ICD  -EP FU, s/p device interrogation, cleared for DC    #HTN, HLD  - cw losartan, atorvastatin    #Hx of Dementia  - baseline AAOx2  - flight risk  - currently on one to one  - daughter is aware and understands the situation  - continue with duloxetine    #CKD 3  - trend cr    DVT ppx: lovenox  ppi  ambulate as tolerated  full code  DASH/ TLC    Dispo:  Physiatry/PT eval

## 2023-02-15 ENCOUNTER — APPOINTMENT (OUTPATIENT)
Dept: CARDIOLOGY | Facility: CLINIC | Age: 83
End: 2023-02-15

## 2023-02-22 ENCOUNTER — APPOINTMENT (OUTPATIENT)
Dept: ELECTROPHYSIOLOGY | Facility: CLINIC | Age: 83
End: 2023-02-22
Payer: MEDICARE

## 2023-02-22 VITALS
BODY MASS INDEX: 25.06 KG/M2 | WEIGHT: 185 LBS | DIASTOLIC BLOOD PRESSURE: 86 MMHG | HEART RATE: 78 BPM | HEIGHT: 72 IN | SYSTOLIC BLOOD PRESSURE: 112 MMHG | OXYGEN SATURATION: 98 %

## 2023-02-22 PROCEDURE — 93282 PRGRMG EVAL IMPLANTABLE DFB: CPT

## 2023-02-22 PROCEDURE — 99215 OFFICE O/P EST HI 40 MIN: CPT

## 2023-02-22 PROCEDURE — 93290 INTERROG DEV EVAL ICPMS IP: CPT | Mod: 26

## 2023-02-22 PROCEDURE — 93000 ELECTROCARDIOGRAM COMPLETE: CPT | Mod: 59

## 2023-03-11 NOTE — ASSESSMENT
[FreeTextEntry1] : ## Dilated Nonischemic cardiomyopathy s/p SC-ICD (VDD, Biotronik, 14, Non-dep)\par ## SVT\par ## Paroxysmal AF\par \par - ICD interrogation shows normally functioning DC-ICD. Battery life ok. Optivol below threshold. + AF episodes.\par - Discussed different management option including clinical observation +/- medication and ablation. After detailed discussion, patient opted for conservative management. Patient will contact us if more episodes or changes the mind. \par - Continue Amiodarone.\par - CHADSVASC 4+ (Age, Cardiomyopathy, HTN).\par - Discussed risk of thromboembolism associated with atrial fibrillation, risk reduction with anticoagulation, and bleeding associated with oral anticoagulation. After discussing pros-cons, they opted to start OAC. We will start eliquis 5 mg PO q12h.\par - Not a candidate for long-term anticoagulation due to dementia and unpredictable medical compliance.\par - Patient is at increased risk of stroke based on CHADSVASC score. He is a good candidate for Amulet implant as an alternative to anticoagulation as had complications with anticoagulation.\par \par I have discussed different treatment options with the patient including other anticoagulation medication. I have explained the risks and benefits of the procedure to the patient. I have explained to the patient the patient will have WALT in approximately 45 days. Patient will remain on aspirin and Plavix for next 6 months, and then ASA only. There is approximately 1-2% chance of any major cardiovascular complication to occur. Complications include, but are not limited to infection, bleeding, and damage to the vessels, hole in the heart, stroke, death and heart attack. The patient understands the risk and would like to proceed with the procedure. Materials were provided to the patient. Patient indicated that all of his questions were answered to his satisfaction and verbalized understanding.\par \par We will refer to independent physician if second opinion needed.\par \par - He will think about it.\par - Device MOON expected in a couple of weeks. We will discuss this again after device change.\par - I discussed the risks, benefits and alternatives for device generator change.  \par I reported a risk of major complications at 2% (predominantly infection) and minor complications at 4%. The details of the procedure and risks associated with undergoing the procedure were discussed in detail including but not limited to, death, myocardial ischemia, stroke, cardiac perforation, potential need for temporary pacemaker implantation, lung damage requiring chest tube (pneumothorax), damage to the existing leads requiring lead revision, blood clot, blood collection requiring blood transfusion or repeat surgery (hematoma), infection, or vascular injury.  All questions were answered to satisfaction and the patient expressed verbal understanding of the procedure, the benefits, and risks, and agreed to proceed. \par - Labs with PCP\par - Remote Monitoring\par - Return in 6 months\par \par \par

## 2023-03-11 NOTE — ADDENDUM
[FreeTextEntry1] : Leesa PRICE assisted in documentation on 02/22/2023 acting as a scribe for Dr. Vinod Brown.\par

## 2023-03-11 NOTE — HISTORY OF PRESENT ILLNESS
[de-identified] : I had a pleasure of seeing Mr. WAITE for follow-up consultation for ICD care. He was previously being followed by Dr. Mao.\par \par Mr. 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 Mild HERNANDEZ. Occasional palpitations when hes doing his 20 lbs dumbbells.\par Denies chest pain, shortness of breath, palpitation, dizziness or LOC except noted above.\par He appears to be very happy.\par Didn't bring meds or remember them.\par 08/18/22: Feels fine. He is accompanied by his daughter. +SVT episodes on ICD. More forgetful.\par 02/22/2023: Feels fine. He is accompanied by daughter. Daughter states he is more forgetful. Demented. Aggressive at times. Does not allow any protective services or support systems in his house. \par \par \par EKG (02/22/2023): SR@90 bpm, PVCs. \par EKG (08/18/22): SR\par EKG (10/20): SR@71, PVC (RVOT?)\par EKG: SR@ 57/min, QRSd 90 ms,  ms\par TTE (19): EF 55% (?)\par Cardio: None

## 2023-03-11 NOTE — PROCEDURE
[de-identified] : [employer] [de-identified] : Ilesto [de-identified] : [id number] [de-identified] : 12[birth year] [de-identified] : LINDA [de-identified] : 50 [de-identified] : 3% [de-identified] : 3 "VF" episodes with ATP delivered -- cannot open EGMs\par many AF episodes\par AF Basile 0.5%\par  0%\par AP-VS 91%

## 2023-03-22 ENCOUNTER — APPOINTMENT (OUTPATIENT)
Dept: ELECTROPHYSIOLOGY | Facility: CLINIC | Age: 83
End: 2023-03-22
Payer: MEDICARE

## 2023-03-22 VITALS
OXYGEN SATURATION: 96 % | HEIGHT: 72 IN | WEIGHT: 185 LBS | DIASTOLIC BLOOD PRESSURE: 92 MMHG | SYSTOLIC BLOOD PRESSURE: 114 MMHG | BODY MASS INDEX: 25.06 KG/M2 | HEART RATE: 102 BPM

## 2023-03-22 PROCEDURE — 93282 PRGRMG EVAL IMPLANTABLE DFB: CPT

## 2023-03-22 PROCEDURE — 93000 ELECTROCARDIOGRAM COMPLETE: CPT | Mod: 59

## 2023-03-22 PROCEDURE — 99215 OFFICE O/P EST HI 40 MIN: CPT

## 2023-03-22 PROCEDURE — 93290 INTERROG DEV EVAL ICPMS IP: CPT | Mod: 26

## 2023-03-22 NOTE — REASON FOR VISIT
[Follow-up Device Check] : is here today for a follow-up device check visit for [Arrhythmias (seen on stored data)] : arrhythmias seen on stored data [MOON (Elective Replacement Indication)] : elective replacement indication [Family Member] : family member

## 2023-03-26 NOTE — ASSESSMENT
[FreeTextEntry1] : ## Dilated Nonischemic cardiomyopathy s/p SC-ICD (VDD, Biotronik, 14, Non-dep)\par ## SVT\par ## Paroxysmal AF\par \par - ICD interrogation shows normally functioning DC-ICD. Battery life at MOON. Optivol below threshold. + AF episodes.\par - Discussed different management option including clinical observation +/- medication and ablation. After detailed discussion, patient opted for conservative management. Patient will contact us if more episodes or changes the mind. \par - Continue Amiodarone.\par - CHADSVASC 4+ (Age, Cardiomyopathy, HTN).\par - Discussed risk of thromboembolism associated with atrial fibrillation, risk reduction with anticoagulation, and bleeding associated with oral anticoagulation. After discussing pros-cons, they opted not to start OAC.\par - Not a candidate for long-term anticoagulation due to dementia and unpredictable medical compliance.\par - Patient is at increased risk of stroke based on CHADSVASC score. He is a good candidate for Amulet implant as an alternative to anticoagulation as had complications with anticoagulation.\par \par I have discussed different treatment options with the patient including other anticoagulation medication. I have explained the risks and benefits of the procedure to the patient. I have explained to the patient the patient will have WALT in approximately 45 days. Patient will remain on aspirin and Plavix for next 6 months, and then ASA only. There is approximately 1-2% chance of any major cardiovascular complication to occur. Complications include, but are not limited to infection, bleeding, and damage to the vessels, hole in the heart, stroke, death and heart attack. The patient understands the risk and would like to proceed with the procedure. Materials were provided to the patient. Patient indicated that all of his questions were answered to his satisfaction and verbalized understanding.\par \par We will refer to independent physician if second opinion needed.\par \par - Device at MOON.\par - I discussed the risks, benefits and alternatives for device generator change. \par I reported a risk of major complications at 2% (predominantly infection) and minor complications at 4%. The details of the procedure and risks associated with undergoing the procedure were discussed in detail including but not limited to, death, myocardial ischemia, stroke, cardiac perforation, potential need for temporary pacemaker implantation, lung damage requiring chest tube (pneumothorax), damage to the existing leads requiring lead revision, blood clot, blood collection requiring blood transfusion or repeat surgery (hematoma), infection, or vascular injury. All questions were answered to satisfaction and the patient expressed verbal understanding of the procedure, the benefits, and risks, and agreed to proceed. \par - Labs with PCP\par - Remote Monitoring\par - Return after GEN change\par \par

## 2023-03-26 NOTE — PROCEDURE
[No] : not [ICD] : Implantable cardioverter-defibrillator [Voltage: ___ volts] : Voltage was [unfilled] volts [Longevity: ___ months] : The estimated remaining battery life is [unfilled] months [Threshold Testing Performed] : Threshold testing was performed [Ventricular] : Ventricular [Lead Imp:  ___ohms] : lead impedance was [unfilled] ohms [Sensing Amplitude ___mv] : sensing amplitude was [unfilled] mv [___V @] : [unfilled] V [___ ms] : [unfilled] ms [None] : none [Counters Reset] : the counters were reset [Pace ___ %] : Pace [unfilled]% [de-identified] : [employer] [de-identified] : Sinus Tach 125 bpm [de-identified] : Juan PEREZ DX [de-identified] : [id number] [de-identified] : 12/12/2014 [de-identified] : LINDA [de-identified] : 1% Battery Remaining, Changeout indicated. [de-identified] : *1% to MOON* Normal Function, Chronically high V. Threshold. Generator Change indicated.

## 2023-03-26 NOTE — ADDENDUM
[FreeTextEntry1] : I, Donavon Chen assisted in documentation on 03/22/2023 acting as a scribe for Dr. Vinod Brown.\par

## 2023-03-26 NOTE — HISTORY OF PRESENT ILLNESS
[de-identified] : The patient noted the following symptom(s):. I had a pleasure of seeing Mr. WAITE for follow-up consultation for ICD care. He was previously being followed by Dr. Mao.\par \par Mr. 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 Mild HERNANDEZ. Occasional palpitations when hes doing his 20 lbs dumbbells.\par Denies chest pain, shortness of breath, palpitation, dizziness or LOC except noted above.\par He appears to be very happy.\par Didn't bring meds or remember them.\par 08/18/22: Feels fine. He is accompanied by his daughter. +SVT episodes on ICD. More forgetful.\par 02/22/2023: Feels fine. He is accompanied by daughter. Daughter states he is more forgetful. Demented. Aggressive at times. Does not allow any protective services or support systems in his house. \par 3/22/23: Feels fine. Accompanied by daughter. \par \par EKG (3/22/23): SR.\par EKG (02/22/2023): SR@90 bpm, PVCs. \par EKG (08/18/22): SR\par EKG (10/20): SR@71, PVC (RVOT?)\par EKG: SR@ 57/min, QRSd 90 ms,  ms\par TTE (19): EF 55% (?)\par Cardio: Dr. Paul

## 2023-03-28 ENCOUNTER — OUTPATIENT (OUTPATIENT)
Dept: OUTPATIENT SERVICES | Facility: HOSPITAL | Age: 83
LOS: 1 days | End: 2023-03-28
Payer: MEDICARE

## 2023-03-28 VITALS
SYSTOLIC BLOOD PRESSURE: 140 MMHG | RESPIRATION RATE: 18 BRPM | HEART RATE: 50 BPM | TEMPERATURE: 98 F | WEIGHT: 188.5 LBS | DIASTOLIC BLOOD PRESSURE: 92 MMHG | OXYGEN SATURATION: 100 %

## 2023-03-28 DIAGNOSIS — T82.111D BREAKDOWN (MECHANICAL) OF CARDIAC PULSE GENERATOR (BATTERY), SUBSEQUENT ENCOUNTER: ICD-10-CM

## 2023-03-28 DIAGNOSIS — Z01.818 ENCOUNTER FOR OTHER PREPROCEDURAL EXAMINATION: ICD-10-CM

## 2023-03-28 DIAGNOSIS — T82.111A BREAKDOWN (MECHANICAL) OF CARDIAC PULSE GENERATOR (BATTERY), INITIAL ENCOUNTER: ICD-10-CM

## 2023-03-28 LAB
ALBUMIN SERPL ELPH-MCNC: 4.7 G/DL — SIGNIFICANT CHANGE UP (ref 3.5–5.2)
ALP SERPL-CCNC: 89 U/L — SIGNIFICANT CHANGE UP (ref 30–115)
ALT FLD-CCNC: 15 U/L — SIGNIFICANT CHANGE UP (ref 0–41)
ANION GAP SERPL CALC-SCNC: 13 MMOL/L — SIGNIFICANT CHANGE UP (ref 7–14)
APTT BLD: 29.2 SEC — SIGNIFICANT CHANGE UP (ref 27–39.2)
AST SERPL-CCNC: 11 U/L — SIGNIFICANT CHANGE UP (ref 0–41)
BASOPHILS # BLD AUTO: 0.06 K/UL — SIGNIFICANT CHANGE UP (ref 0–0.2)
BASOPHILS NFR BLD AUTO: 0.9 % — SIGNIFICANT CHANGE UP (ref 0–1)
BILIRUB SERPL-MCNC: 0.8 MG/DL — SIGNIFICANT CHANGE UP (ref 0.2–1.2)
BUN SERPL-MCNC: 14 MG/DL — SIGNIFICANT CHANGE UP (ref 10–20)
CALCIUM SERPL-MCNC: 9.6 MG/DL — SIGNIFICANT CHANGE UP (ref 8.4–10.5)
CHLORIDE SERPL-SCNC: 106 MMOL/L — SIGNIFICANT CHANGE UP (ref 98–110)
CO2 SERPL-SCNC: 23 MMOL/L — SIGNIFICANT CHANGE UP (ref 17–32)
CREAT SERPL-MCNC: 1.1 MG/DL — SIGNIFICANT CHANGE UP (ref 0.7–1.5)
EGFR: 68 ML/MIN/1.73M2 — SIGNIFICANT CHANGE UP
EOSINOPHIL # BLD AUTO: 0.13 K/UL — SIGNIFICANT CHANGE UP (ref 0–0.7)
EOSINOPHIL NFR BLD AUTO: 1.9 % — SIGNIFICANT CHANGE UP (ref 0–8)
GLUCOSE SERPL-MCNC: 130 MG/DL — HIGH (ref 70–99)
HCT VFR BLD CALC: 42.4 % — SIGNIFICANT CHANGE UP (ref 42–52)
HGB BLD-MCNC: 14.1 G/DL — SIGNIFICANT CHANGE UP (ref 14–18)
IMM GRANULOCYTES NFR BLD AUTO: 0.3 % — SIGNIFICANT CHANGE UP (ref 0.1–0.3)
INR BLD: 1 RATIO — SIGNIFICANT CHANGE UP (ref 0.65–1.3)
LYMPHOCYTES # BLD AUTO: 1.65 K/UL — SIGNIFICANT CHANGE UP (ref 1.2–3.4)
LYMPHOCYTES # BLD AUTO: 24.1 % — SIGNIFICANT CHANGE UP (ref 20.5–51.1)
MCHC RBC-ENTMCNC: 29 PG — SIGNIFICANT CHANGE UP (ref 27–31)
MCHC RBC-ENTMCNC: 33.3 G/DL — SIGNIFICANT CHANGE UP (ref 32–37)
MCV RBC AUTO: 87.2 FL — SIGNIFICANT CHANGE UP (ref 80–94)
MONOCYTES # BLD AUTO: 0.54 K/UL — SIGNIFICANT CHANGE UP (ref 0.1–0.6)
MONOCYTES NFR BLD AUTO: 7.9 % — SIGNIFICANT CHANGE UP (ref 1.7–9.3)
NEUTROPHILS # BLD AUTO: 4.44 K/UL — SIGNIFICANT CHANGE UP (ref 1.4–6.5)
NEUTROPHILS NFR BLD AUTO: 64.9 % — SIGNIFICANT CHANGE UP (ref 42.2–75.2)
NRBC # BLD: 0 /100 WBCS — SIGNIFICANT CHANGE UP (ref 0–0)
PLATELET # BLD AUTO: 331 K/UL — SIGNIFICANT CHANGE UP (ref 130–400)
POTASSIUM SERPL-MCNC: 3.8 MMOL/L — SIGNIFICANT CHANGE UP (ref 3.5–5)
POTASSIUM SERPL-SCNC: 3.8 MMOL/L — SIGNIFICANT CHANGE UP (ref 3.5–5)
PROT SERPL-MCNC: 6.4 G/DL — SIGNIFICANT CHANGE UP (ref 6–8)
PROTHROM AB SERPL-ACNC: 11.4 SEC — SIGNIFICANT CHANGE UP (ref 9.95–12.87)
RBC # BLD: 4.86 M/UL — SIGNIFICANT CHANGE UP (ref 4.7–6.1)
RBC # FLD: 14.6 % — HIGH (ref 11.5–14.5)
SODIUM SERPL-SCNC: 142 MMOL/L — SIGNIFICANT CHANGE UP (ref 135–146)
WBC # BLD: 6.84 K/UL — SIGNIFICANT CHANGE UP (ref 4.8–10.8)
WBC # FLD AUTO: 6.84 K/UL — SIGNIFICANT CHANGE UP (ref 4.8–10.8)

## 2023-03-28 PROCEDURE — 99214 OFFICE O/P EST MOD 30 MIN: CPT | Mod: 25

## 2023-03-28 PROCEDURE — 87086 URINE CULTURE/COLONY COUNT: CPT

## 2023-03-28 PROCEDURE — 87186 SC STD MICRODIL/AGAR DIL: CPT

## 2023-03-28 PROCEDURE — 85730 THROMBOPLASTIN TIME PARTIAL: CPT

## 2023-03-28 PROCEDURE — 81001 URINALYSIS AUTO W/SCOPE: CPT

## 2023-03-28 PROCEDURE — 85025 COMPLETE CBC W/AUTO DIFF WBC: CPT

## 2023-03-28 PROCEDURE — 36415 COLL VENOUS BLD VENIPUNCTURE: CPT

## 2023-03-28 PROCEDURE — 80053 COMPREHEN METABOLIC PANEL: CPT

## 2023-03-28 PROCEDURE — 87641 MR-STAPH DNA AMP PROBE: CPT

## 2023-03-28 PROCEDURE — 87640 STAPH A DNA AMP PROBE: CPT

## 2023-03-28 PROCEDURE — 93005 ELECTROCARDIOGRAM TRACING: CPT

## 2023-03-28 PROCEDURE — 85610 PROTHROMBIN TIME: CPT

## 2023-03-28 PROCEDURE — 93010 ELECTROCARDIOGRAM REPORT: CPT

## 2023-03-28 NOTE — H&P PST ADULT - NSICDXPASTMEDICALHX_GEN_ALL_CORE_FT
PAST MEDICAL HISTORY:  A-fib     H/O inguinal hernia repair     High cholesterol     Hypertension

## 2023-03-28 NOTE — H&P PST ADULT - NSICDXFAMILYHX_GEN_ALL_CORE_FT
FAMILY HISTORY:  Father  Still living? Unknown  FH: colon cancer, Age at diagnosis: Age Unknown    Mother  Still living? No  FH: colon cancer, Age at diagnosis: Age Unknown

## 2023-03-28 NOTE — H&P PST ADULT - HISTORY OF PRESENT ILLNESS
Patient is a 82 year old male presenting to PAST in preparation for ICD GEN CHANGE  on 4/3 under sedation anesthesia by Dr. Brown  Pt had battery of ppm checked the battery is very low and has been advised to have above was placed in 2014  PATIENT CURRENTLY DENIES CHEST PAIN  SHORTNESS OF BREATH  PALPITATIONS,  DYSURIA, OR UPPER RESPIRATORY INFECTION IN PAST 2 WEEKS  EXERCISE  TOLERANCE  1 FLIGHT OF STAIRS  WITHOUT SHORTNESS OF BREATH    Anesthesia Alert  NO--Difficult Airway  NO--History of neck surgery or radiation  NO--Limited ROM of neck  NO--History of Malignant hyperthermia  NO--Personal or family history of Pseudocholinesterase deficiency  NO--Prior Anesthesia Complication  NO--Latex Allergy  NO--Loose teeth  NO--History of Rheumatoid Arthritis  NO--BOAZ  NO-- BLEEDING RISK  NO--Other_____    As per patient, this is their complete medical and surgical history, including medications both prescribed or over the counter.  Patient verbalized understanding of instructions and was given the opportunity to ask questions and have them answered.  Patient denies any signs or symptoms of COVID 19 and denies contact with known positive individuals.  They have an appointment for COVID testing pre-procedure and acknowledge its time and place.  They were instructed to quarantine pre-procedure, practice exposure control measures, continue to self-monitor and report any concerns to their proceduralist.

## 2023-03-29 DIAGNOSIS — T82.111D BREAKDOWN (MECHANICAL) OF CARDIAC PULSE GENERATOR (BATTERY), SUBSEQUENT ENCOUNTER: ICD-10-CM

## 2023-03-29 DIAGNOSIS — Z01.818 ENCOUNTER FOR OTHER PREPROCEDURAL EXAMINATION: ICD-10-CM

## 2023-03-29 LAB
APPEARANCE UR: CLEAR — SIGNIFICANT CHANGE UP
BACTERIA # UR AUTO: NEGATIVE — SIGNIFICANT CHANGE UP
BILIRUB UR-MCNC: NEGATIVE — SIGNIFICANT CHANGE UP
COLOR SPEC: YELLOW — SIGNIFICANT CHANGE UP
DIFF PNL FLD: NEGATIVE — SIGNIFICANT CHANGE UP
EPI CELLS # UR: 1 /HPF — SIGNIFICANT CHANGE UP (ref 0–5)
GLUCOSE UR QL: NEGATIVE — SIGNIFICANT CHANGE UP
HYALINE CASTS # UR AUTO: 2 /LPF — SIGNIFICANT CHANGE UP (ref 0–7)
KETONES UR-MCNC: NEGATIVE — SIGNIFICANT CHANGE UP
LEUKOCYTE ESTERASE UR-ACNC: NEGATIVE — SIGNIFICANT CHANGE UP
MRSA PCR RESULT.: NEGATIVE — SIGNIFICANT CHANGE UP
NITRITE UR-MCNC: NEGATIVE — SIGNIFICANT CHANGE UP
PH UR: 6.5 — SIGNIFICANT CHANGE UP (ref 5–8)
PROT UR-MCNC: ABNORMAL
RBC CASTS # UR COMP ASSIST: 1 /HPF — SIGNIFICANT CHANGE UP (ref 0–4)
SP GR SPEC: 1.02 — SIGNIFICANT CHANGE UP (ref 1.01–1.03)
UROBILINOGEN FLD QL: ABNORMAL
WBC UR QL: 2 /HPF — SIGNIFICANT CHANGE UP (ref 0–5)

## 2023-04-03 ENCOUNTER — OUTPATIENT (OUTPATIENT)
Dept: INPATIENT UNIT | Facility: HOSPITAL | Age: 83
LOS: 1 days | Discharge: HOME | End: 2023-04-03
Payer: MEDICARE

## 2023-04-03 ENCOUNTER — APPOINTMENT (OUTPATIENT)
Dept: ELECTROPHYSIOLOGY | Facility: HOSPITAL | Age: 83
End: 2023-04-03

## 2023-04-03 DIAGNOSIS — T82.111A BREAKDOWN (MECHANICAL) OF CARDIAC PULSE GENERATOR (BATTERY), INITIAL ENCOUNTER: ICD-10-CM

## 2023-04-03 DIAGNOSIS — Z45.02 ENCOUNTER FOR ADJUSTMENT AND MANAGEMENT OF AUTOMATIC IMPLANTABLE CARDIAC DEFIBRILLATOR: ICD-10-CM

## 2023-04-03 PROCEDURE — C1722: CPT

## 2023-04-03 PROCEDURE — 33262 RMVL& REPLC PULSE GEN 1 LEAD: CPT

## 2023-04-03 RX ORDER — AMIODARONE HYDROCHLORIDE 400 MG/1
0.5 TABLET ORAL
Qty: 0 | Refills: 0 | DISCHARGE

## 2023-04-03 RX ORDER — METOPROLOL TARTRATE 50 MG
1 TABLET ORAL
Qty: 0 | Refills: 0 | DISCHARGE

## 2023-04-03 RX ORDER — CEPHALEXIN 500 MG
1 CAPSULE ORAL
Qty: 10 | Refills: 0
Start: 2023-04-03 | End: 2023-04-07

## 2023-04-03 RX ORDER — CEFAZOLIN SODIUM 1 G
2000 VIAL (EA) INJECTION ONCE
Refills: 0 | Status: COMPLETED | OUTPATIENT
Start: 2023-04-03 | End: 2023-04-03

## 2023-04-03 RX ORDER — CEFAZOLIN SODIUM 1 G
1000 VIAL (EA) INJECTION ONCE
Refills: 0 | Status: COMPLETED | OUTPATIENT
Start: 2023-04-03 | End: 2023-04-03

## 2023-04-03 RX ORDER — VALSARTAN 80 MG/1
0 TABLET ORAL
Refills: 0 | DISCHARGE

## 2023-04-03 RX ADMIN — Medication 100 MILLIGRAM(S): at 08:25

## 2023-04-03 RX ADMIN — Medication 100 MILLIGRAM(S): at 08:16

## 2023-04-10 ENCOUNTER — APPOINTMENT (OUTPATIENT)
Dept: ELECTROPHYSIOLOGY | Facility: CLINIC | Age: 83
End: 2023-04-10
Payer: MEDICARE

## 2023-04-10 VITALS
HEART RATE: 83 BPM | SYSTOLIC BLOOD PRESSURE: 120 MMHG | DIASTOLIC BLOOD PRESSURE: 70 MMHG | WEIGHT: 185 LBS | RESPIRATION RATE: 16 BRPM | TEMPERATURE: 97.1 F | HEIGHT: 74 IN | BODY MASS INDEX: 23.74 KG/M2

## 2023-04-10 DIAGNOSIS — I48.0 PAROXYSMAL ATRIAL FIBRILLATION: ICD-10-CM

## 2023-04-10 DIAGNOSIS — Z48.89 ENCOUNTER FOR OTHER SPECIFIED SURGICAL AFTERCARE: ICD-10-CM

## 2023-04-10 DIAGNOSIS — Z45.02 ENCOUNTER FOR ADJUSTMENT AND MANAGEMENT OF AUTOMATIC IMPLANTABLE CARDIAC DEFIBRILLATOR: ICD-10-CM

## 2023-04-10 DIAGNOSIS — I42.0 DILATED CARDIOMYOPATHY: ICD-10-CM

## 2023-04-10 PROCEDURE — 99024 POSTOP FOLLOW-UP VISIT: CPT

## 2023-04-10 PROCEDURE — 93282 PRGRMG EVAL IMPLANTABLE DFB: CPT

## 2023-04-10 PROCEDURE — 93000 ELECTROCARDIOGRAM COMPLETE: CPT | Mod: 59

## 2023-04-10 RX ORDER — CLINDAMYCIN PHOSPHATE 10 MG/ML
1 SOLUTION TOPICAL
Qty: 60 | Refills: 0 | Status: COMPLETED | COMMUNITY
Start: 2022-06-02 | End: 2023-04-10

## 2023-04-10 RX ORDER — LOSARTAN POTASSIUM 100 MG/1
100 TABLET, FILM COATED ORAL DAILY
Qty: 90 | Refills: 2 | Status: ACTIVE | COMMUNITY

## 2023-04-10 RX ORDER — DULOXETINE HYDROCHLORIDE 60 MG/1
60 CAPSULE, DELAYED RELEASE PELLETS ORAL
Qty: 30 | Refills: 0 | Status: ACTIVE | COMMUNITY
Start: 2022-01-10

## 2023-04-10 RX ORDER — AMIODARONE HYDROCHLORIDE 100 MG/1
100 TABLET ORAL
Qty: 90 | Refills: 3 | Status: ACTIVE | COMMUNITY
Start: 2022-01-10

## 2023-04-10 RX ORDER — METOPROLOL SUCCINATE 50 MG/1
50 TABLET, EXTENDED RELEASE ORAL DAILY
Refills: 3 | Status: ACTIVE | COMMUNITY
Start: 2022-01-10

## 2023-04-10 RX ORDER — ATORVASTATIN CALCIUM 80 MG/1
80 TABLET, FILM COATED ORAL DAILY
Refills: 0 | Status: ACTIVE | COMMUNITY

## 2023-04-10 NOTE — ASSESSMENT
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 I discussed with patient post-operative care in great details. I answered all questions to their satisfaction. Patient was pleased with the result of their procedure. Advised pt to avoid carrying more than 5 lb and lifting his left arm above his shoulder for a total of 6 weeks from procedure date. Also advised pt to avoid fishing, swimming and golfing for a total of 3 mo to avoid lead dislodgment.\par - Device function normal. All parameters stable. Optivol below threshold. No events. See Device Printout\par - Remote monitoring was discussed with patient and daughter, schedule, process, as well as associated co-pay that may not be covered by their insurance. Daughter will go to patient's home and restart remote monitor as it did not transmit.\par - Continue Amiodarone. pt has poor compliance with medications as per daughter.\par - CHADSVASC 4+ (Age, Cardiomyopathy, HTN).\par - Not a candidate for long-term anticoagulation due to dementia and unpredictable medical compliance.\par - Patient is at increased risk of stroke based on CHADSVASC score. He is a good candidate for Amulet implant as an alternative to anticoagulation as had complications with anticoagulation. Pt and daughter wish to think about it.\par \par \par RTO 6-8 months/PRN\par \par \par I have also advised the patient to go to the nearest emergency room if he experiences any chest pain, dyspnea, syncope, or has any other compelling symptoms.\par \par

## 2023-04-10 NOTE — PHYSICAL EXAM
[General Appearance - Well Developed] : well developed [Normal Appearance] : normal appearance [Well Groomed] : well groomed [General Appearance - Well Nourished] : well nourished [No Deformities] : no deformities [General Appearance - In No Acute Distress] : no acute distress [Heart Rate And Rhythm] : heart rate and rhythm were normal [Heart Sounds] : normal S1 and S2 [Murmurs] : no murmurs present [Respiration, Rhythm And Depth] : normal respiratory rhythm and effort [Exaggerated Use Of Accessory Muscles For Inspiration] : no accessory muscle use [Auscultation Breath Sounds / Voice Sounds] : lungs were clear to auscultation bilaterally [Clean] : clean [Dry] : dry [Abdomen Soft] : soft [Abdomen Tenderness] : non-tender [Abdomen Mass (___ Cm)] : no abdominal mass palpated [Nail Clubbing] : no clubbing of the fingernails [Cyanosis, Localized] : no localized cyanosis [Petechial Hemorrhages (___cm)] : no petechial hemorrhages [] : no ischemic changes [Healing Well] : healing well

## 2023-04-10 NOTE — CARDIOLOGY SUMMARY
[de-identified] : (0410/2023): SR, PVCs, 1st degree AV block   HR 83bpm\par (3/22/2023): SR.\par (02/22/2023): SR@90 bpm, PVCs. \par (08/18/2022): SR\par (10/2020): SR@71, PVC (RVOT?)\par SR@ 57/min, QRSd 90 ms,  ms [de-identified] : TTE (19): EF 55% (?) [de-identified] : s/p SC-ICD gen change (VDI, Biotronik, 4/03/2023, non-dep)\par s/p SC-ICD (VDD, Biotronik, 2014, Non-dep)

## 2023-04-10 NOTE — HISTORY OF PRESENT ILLNESS
[de-identified] : Cardio: Dr. Paul\par \par I had a pleasure of seeing Mr. WAITE for follow-up consultation for ICD care. He was previously being followed by Dr. Mao.\par \par Mr. 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 Pt's daughter states the patient c/o dizziness yesterday and did not want to attend Easter dinner.\par \par Denies chest pain, shortness of breath, palpitation, LOC.\par

## 2023-04-10 NOTE — PROCEDURE
[No] : not [ICD] : Implantable cardioverter-defibrillator [Voltage: ___ volts] : Voltage was [unfilled] volts [Longevity: ___ months] : The estimated remaining battery life is [unfilled] months [Threshold Testing Performed] : Threshold testing was performed [Ventricular] : Ventricular [Lead Imp:  ___ohms] : lead impedance was [unfilled] ohms [Sensing Amplitude ___mv] : sensing amplitude was [unfilled] mv [___V @] : [unfilled] V [___ ms] : [unfilled] ms [None] : none [Counters Reset] : the counters were reset [Pace ___ %] : Pace [unfilled]% [VDI] : VDI [Charge Time: ___ sec] : charge time was [unfilled] seconds [NSR] : normal sinus rhythm [See Device Printout] : See device printout [de-identified] : [employer] [de-identified] : Intica Akira 7 VR-T DX [de-identified] : [id number] [de-identified] : 04/03/2023 [de-identified] : 100 % [de-identified] : No events

## 2023-04-12 PROBLEM — Z98.890 OTHER SPECIFIED POSTPROCEDURAL STATES: Chronic | Status: ACTIVE | Noted: 2023-03-28

## 2023-04-12 PROBLEM — I48.91 UNSPECIFIED ATRIAL FIBRILLATION: Chronic | Status: ACTIVE | Noted: 2023-03-28

## 2023-04-25 ENCOUNTER — OUTPATIENT (OUTPATIENT)
Dept: OUTPATIENT SERVICES | Facility: HOSPITAL | Age: 83
LOS: 1 days | End: 2023-04-25
Payer: MEDICARE

## 2023-04-25 VITALS
OXYGEN SATURATION: 96 % | WEIGHT: 184.97 LBS | SYSTOLIC BLOOD PRESSURE: 132 MMHG | HEART RATE: 80 BPM | RESPIRATION RATE: 16 BRPM | DIASTOLIC BLOOD PRESSURE: 84 MMHG | HEIGHT: 73 IN | TEMPERATURE: 98 F

## 2023-04-25 DIAGNOSIS — Z98.890 OTHER SPECIFIED POSTPROCEDURAL STATES: Chronic | ICD-10-CM

## 2023-04-25 DIAGNOSIS — Z01.818 ENCOUNTER FOR OTHER PREPROCEDURAL EXAMINATION: ICD-10-CM

## 2023-04-25 DIAGNOSIS — I48.19 OTHER PERSISTENT ATRIAL FIBRILLATION: ICD-10-CM

## 2023-04-25 DIAGNOSIS — Z95.810 PRESENCE OF AUTOMATIC (IMPLANTABLE) CARDIAC DEFIBRILLATOR: Chronic | ICD-10-CM

## 2023-04-25 LAB
ALBUMIN SERPL ELPH-MCNC: 4.7 G/DL — SIGNIFICANT CHANGE UP (ref 3.5–5.2)
ALP SERPL-CCNC: 87 U/L — SIGNIFICANT CHANGE UP (ref 30–115)
ALT FLD-CCNC: 19 U/L — SIGNIFICANT CHANGE UP (ref 0–41)
ANION GAP SERPL CALC-SCNC: 14 MMOL/L — SIGNIFICANT CHANGE UP (ref 7–14)
AST SERPL-CCNC: 14 U/L — SIGNIFICANT CHANGE UP (ref 0–41)
BASOPHILS # BLD AUTO: 0.08 K/UL — SIGNIFICANT CHANGE UP (ref 0–0.2)
BASOPHILS NFR BLD AUTO: 1 % — SIGNIFICANT CHANGE UP (ref 0–1)
BILIRUB SERPL-MCNC: 0.5 MG/DL — SIGNIFICANT CHANGE UP (ref 0.2–1.2)
BUN SERPL-MCNC: 14 MG/DL — SIGNIFICANT CHANGE UP (ref 10–20)
CALCIUM SERPL-MCNC: 9.3 MG/DL — SIGNIFICANT CHANGE UP (ref 8.4–10.5)
CHLORIDE SERPL-SCNC: 102 MMOL/L — SIGNIFICANT CHANGE UP (ref 98–110)
CO2 SERPL-SCNC: 23 MMOL/L — SIGNIFICANT CHANGE UP (ref 17–32)
CREAT SERPL-MCNC: 1.2 MG/DL — SIGNIFICANT CHANGE UP (ref 0.7–1.5)
EGFR: 61 ML/MIN/1.73M2 — SIGNIFICANT CHANGE UP
EOSINOPHIL # BLD AUTO: 0.16 K/UL — SIGNIFICANT CHANGE UP (ref 0–0.7)
EOSINOPHIL NFR BLD AUTO: 2 % — SIGNIFICANT CHANGE UP (ref 0–8)
GLUCOSE SERPL-MCNC: 89 MG/DL — SIGNIFICANT CHANGE UP (ref 70–99)
HCT VFR BLD CALC: 43.3 % — SIGNIFICANT CHANGE UP (ref 42–52)
HGB BLD-MCNC: 14.2 G/DL — SIGNIFICANT CHANGE UP (ref 14–18)
IMM GRANULOCYTES NFR BLD AUTO: 0.3 % — SIGNIFICANT CHANGE UP (ref 0.1–0.3)
INR BLD: 0.93 RATIO — SIGNIFICANT CHANGE UP (ref 0.65–1.3)
LYMPHOCYTES # BLD AUTO: 1.68 K/UL — SIGNIFICANT CHANGE UP (ref 1.2–3.4)
LYMPHOCYTES # BLD AUTO: 21.1 % — SIGNIFICANT CHANGE UP (ref 20.5–51.1)
MCHC RBC-ENTMCNC: 29.3 PG — SIGNIFICANT CHANGE UP (ref 27–31)
MCHC RBC-ENTMCNC: 32.8 G/DL — SIGNIFICANT CHANGE UP (ref 32–37)
MCV RBC AUTO: 89.5 FL — SIGNIFICANT CHANGE UP (ref 80–94)
MONOCYTES # BLD AUTO: 0.68 K/UL — HIGH (ref 0.1–0.6)
MONOCYTES NFR BLD AUTO: 8.5 % — SIGNIFICANT CHANGE UP (ref 1.7–9.3)
NEUTROPHILS # BLD AUTO: 5.36 K/UL — SIGNIFICANT CHANGE UP (ref 1.4–6.5)
NEUTROPHILS NFR BLD AUTO: 67.1 % — SIGNIFICANT CHANGE UP (ref 42.2–75.2)
NRBC # BLD: 0 /100 WBCS — SIGNIFICANT CHANGE UP (ref 0–0)
PLATELET # BLD AUTO: 302 K/UL — SIGNIFICANT CHANGE UP (ref 130–400)
PMV BLD: 10.3 FL — SIGNIFICANT CHANGE UP (ref 7.4–10.4)
POTASSIUM SERPL-MCNC: 4 MMOL/L — SIGNIFICANT CHANGE UP (ref 3.5–5)
POTASSIUM SERPL-SCNC: 4 MMOL/L — SIGNIFICANT CHANGE UP (ref 3.5–5)
PROT SERPL-MCNC: 6.3 G/DL — SIGNIFICANT CHANGE UP (ref 6–8)
PROTHROM AB SERPL-ACNC: 10.6 SEC — SIGNIFICANT CHANGE UP (ref 9.95–12.87)
RBC # BLD: 4.84 M/UL — SIGNIFICANT CHANGE UP (ref 4.7–6.1)
RBC # FLD: 14.2 % — SIGNIFICANT CHANGE UP (ref 11.5–14.5)
SODIUM SERPL-SCNC: 139 MMOL/L — SIGNIFICANT CHANGE UP (ref 135–146)
WBC # BLD: 7.98 K/UL — SIGNIFICANT CHANGE UP (ref 4.8–10.8)
WBC # FLD AUTO: 7.98 K/UL — SIGNIFICANT CHANGE UP (ref 4.8–10.8)

## 2023-04-25 PROCEDURE — 87640 STAPH A DNA AMP PROBE: CPT

## 2023-04-25 PROCEDURE — 85025 COMPLETE CBC W/AUTO DIFF WBC: CPT

## 2023-04-25 PROCEDURE — 87641 MR-STAPH DNA AMP PROBE: CPT

## 2023-04-25 PROCEDURE — 85730 THROMBOPLASTIN TIME PARTIAL: CPT

## 2023-04-25 PROCEDURE — 87186 SC STD MICRODIL/AGAR DIL: CPT

## 2023-04-25 PROCEDURE — 36415 COLL VENOUS BLD VENIPUNCTURE: CPT

## 2023-04-25 PROCEDURE — 80053 COMPREHEN METABOLIC PANEL: CPT

## 2023-04-25 PROCEDURE — 87086 URINE CULTURE/COLONY COUNT: CPT

## 2023-04-25 PROCEDURE — 81001 URINALYSIS AUTO W/SCOPE: CPT

## 2023-04-25 PROCEDURE — 99214 OFFICE O/P EST MOD 30 MIN: CPT | Mod: 25

## 2023-04-25 PROCEDURE — 85610 PROTHROMBIN TIME: CPT

## 2023-04-25 NOTE — H&P PST ADULT - MUSCULOSKELETAL
negative normal/normal gait/strength 5/5 bilateral upper extremities/strength 5/5 bilateral lower extremities

## 2023-04-25 NOTE — H&P PST ADULT - HISTORY OF PRESENT ILLNESS
82yr old male with h/o Afib presents with his daughter-Mary Lou Is scheduled for  AMULET/ WALT. Denies COVID S/S. Recd 3doses vaccine. Verbalized understanding of COVID prevention measures. Exercise ibis 2-3 FLAT BLOCKS. Keeps repeating " What surgery am I having? ,for what? you did not tell me about this ? Daughter is the HCP -will sign consents.  Anesthesia Alert  NO--Difficult Airway   NO--History of neck surgery or radiation  NO--Limited ROM of neck  NO--History of Malignant hyperthermia  NO--Personal or family history of Pseudocholinesterase deficiency  NO--Prior Anesthesia Complication  NO--Latex Allergy  NO--Loose teeth  NO--History of Rheumatoid Arthritis  NO--BOAZ  No Bleeding risk  NO--Other_____

## 2023-04-25 NOTE — H&P PST ADULT - NSICDXPASTSURGICALHX_GEN_ALL_CORE_FT
PAST SURGICAL HISTORY:  AICD (automatic cardioverter/defibrillator) present     History of lumbar surgery

## 2023-04-26 DIAGNOSIS — Z01.818 ENCOUNTER FOR OTHER PREPROCEDURAL EXAMINATION: ICD-10-CM

## 2023-04-26 DIAGNOSIS — I48.19 OTHER PERSISTENT ATRIAL FIBRILLATION: ICD-10-CM

## 2023-04-26 LAB
APPEARANCE UR: CLEAR — SIGNIFICANT CHANGE UP
BACTERIA # UR AUTO: NEGATIVE — SIGNIFICANT CHANGE UP
BILIRUB UR-MCNC: NEGATIVE — SIGNIFICANT CHANGE UP
COLOR SPEC: YELLOW — SIGNIFICANT CHANGE UP
DIFF PNL FLD: NEGATIVE — SIGNIFICANT CHANGE UP
EPI CELLS # UR: 1 /HPF — SIGNIFICANT CHANGE UP (ref 0–5)
GLUCOSE UR QL: NEGATIVE — SIGNIFICANT CHANGE UP
HYALINE CASTS # UR AUTO: 1 /LPF — SIGNIFICANT CHANGE UP (ref 0–7)
KETONES UR-MCNC: NEGATIVE — SIGNIFICANT CHANGE UP
LEUKOCYTE ESTERASE UR-ACNC: NEGATIVE — SIGNIFICANT CHANGE UP
MRSA PCR RESULT.: NEGATIVE — SIGNIFICANT CHANGE UP
NITRITE UR-MCNC: NEGATIVE — SIGNIFICANT CHANGE UP
PH UR: 6 — SIGNIFICANT CHANGE UP (ref 5–8)
PROT UR-MCNC: ABNORMAL
RBC CASTS # UR COMP ASSIST: 2 /HPF — SIGNIFICANT CHANGE UP (ref 0–4)
SP GR SPEC: 1.02 — SIGNIFICANT CHANGE UP (ref 1.01–1.03)
UROBILINOGEN FLD QL: ABNORMAL
WBC UR QL: 1 /HPF — SIGNIFICANT CHANGE UP (ref 0–5)

## 2023-04-28 ENCOUNTER — NON-APPOINTMENT (OUTPATIENT)
Age: 83
End: 2023-04-28

## 2023-05-02 ENCOUNTER — INPATIENT (INPATIENT)
Facility: HOSPITAL | Age: 83
LOS: 0 days | Discharge: HOME HEALTH | DRG: 274 | End: 2023-05-03
Attending: STUDENT IN AN ORGANIZED HEALTH CARE EDUCATION/TRAINING PROGRAM | Admitting: STUDENT IN AN ORGANIZED HEALTH CARE EDUCATION/TRAINING PROGRAM
Payer: MEDICARE

## 2023-05-02 ENCOUNTER — APPOINTMENT (OUTPATIENT)
Dept: ELECTROPHYSIOLOGY | Facility: HOSPITAL | Age: 83
End: 2023-05-02

## 2023-05-02 ENCOUNTER — TRANSCRIPTION ENCOUNTER (OUTPATIENT)
Age: 83
End: 2023-05-02

## 2023-05-02 VITALS
DIASTOLIC BLOOD PRESSURE: 75 MMHG | HEART RATE: 63 BPM | OXYGEN SATURATION: 98 % | TEMPERATURE: 97 F | SYSTOLIC BLOOD PRESSURE: 124 MMHG | WEIGHT: 182.54 LBS | RESPIRATION RATE: 20 BRPM

## 2023-05-02 DIAGNOSIS — Z95.810 PRESENCE OF AUTOMATIC (IMPLANTABLE) CARDIAC DEFIBRILLATOR: Chronic | ICD-10-CM

## 2023-05-02 DIAGNOSIS — F10.91 ALCOHOL USE, UNSPECIFIED, IN REMISSION: ICD-10-CM

## 2023-05-02 DIAGNOSIS — Z95.810 PRESENCE OF AUTOMATIC (IMPLANTABLE) CARDIAC DEFIBRILLATOR: ICD-10-CM

## 2023-05-02 DIAGNOSIS — I48.19 OTHER PERSISTENT ATRIAL FIBRILLATION: ICD-10-CM

## 2023-05-02 DIAGNOSIS — I42.0 DILATED CARDIOMYOPATHY: ICD-10-CM

## 2023-05-02 DIAGNOSIS — I48.91 UNSPECIFIED ATRIAL FIBRILLATION: ICD-10-CM

## 2023-05-02 DIAGNOSIS — Z98.890 OTHER SPECIFIED POSTPROCEDURAL STATES: Chronic | ICD-10-CM

## 2023-05-02 DIAGNOSIS — F03.90 UNSPECIFIED DEMENTIA WITHOUT BEHAVIORAL DISTURBANCE: ICD-10-CM

## 2023-05-02 DIAGNOSIS — I10 ESSENTIAL (PRIMARY) HYPERTENSION: ICD-10-CM

## 2023-05-02 DIAGNOSIS — E78.5 HYPERLIPIDEMIA, UNSPECIFIED: ICD-10-CM

## 2023-05-02 LAB — BLD GP AB SCN SERPL QL: SIGNIFICANT CHANGE UP

## 2023-05-02 PROCEDURE — 33340 PERQ CLSR TCAT L ATR APNDGE: CPT | Mod: Q0

## 2023-05-02 PROCEDURE — C1887: CPT

## 2023-05-02 PROCEDURE — C1889: CPT

## 2023-05-02 PROCEDURE — 86900 BLOOD TYPING SEROLOGIC ABO: CPT

## 2023-05-02 PROCEDURE — 93287 PERI-PX DEVICE EVAL & PRGR: CPT | Mod: 26

## 2023-05-02 PROCEDURE — C1769: CPT

## 2023-05-02 PROCEDURE — 93662 INTRACARDIAC ECG (ICE): CPT | Mod: 26

## 2023-05-02 PROCEDURE — 86901 BLOOD TYPING SEROLOGIC RH(D): CPT

## 2023-05-02 PROCEDURE — 93005 ELECTROCARDIOGRAM TRACING: CPT

## 2023-05-02 PROCEDURE — C9399: CPT

## 2023-05-02 PROCEDURE — C1894: CPT

## 2023-05-02 PROCEDURE — 97161 PT EVAL LOW COMPLEX 20 MIN: CPT | Mod: GP

## 2023-05-02 PROCEDURE — 86850 RBC ANTIBODY SCREEN: CPT

## 2023-05-02 PROCEDURE — C1759: CPT

## 2023-05-02 PROCEDURE — C1760: CPT

## 2023-05-02 PROCEDURE — 76937 US GUIDE VASCULAR ACCESS: CPT | Mod: 26

## 2023-05-02 PROCEDURE — 36415 COLL VENOUS BLD VENIPUNCTURE: CPT

## 2023-05-02 RX ORDER — ASPIRIN/CALCIUM CARB/MAGNESIUM 324 MG
81 TABLET ORAL DAILY
Refills: 0 | Status: DISCONTINUED | OUTPATIENT
Start: 2023-05-02 | End: 2023-05-03

## 2023-05-02 RX ORDER — CLOPIDOGREL BISULFATE 75 MG/1
75 TABLET, FILM COATED ORAL DAILY
Refills: 0 | Status: DISCONTINUED | OUTPATIENT
Start: 2023-05-02 | End: 2023-05-03

## 2023-05-02 RX ORDER — CLOPIDOGREL BISULFATE 75 MG/1
75 TABLET, FILM COATED ORAL DAILY
Refills: 0 | Status: DISCONTINUED | OUTPATIENT
Start: 2023-05-02 | End: 2023-05-02

## 2023-05-02 RX ORDER — METOPROLOL TARTRATE 50 MG
25 TABLET ORAL ONCE
Refills: 0 | Status: DISCONTINUED | OUTPATIENT
Start: 2023-05-02 | End: 2023-05-02

## 2023-05-02 RX ORDER — QUETIAPINE FUMARATE 200 MG/1
25 TABLET, FILM COATED ORAL AT BEDTIME
Refills: 0 | Status: DISCONTINUED | OUTPATIENT
Start: 2023-05-02 | End: 2023-05-03

## 2023-05-02 RX ORDER — ATORVASTATIN CALCIUM 80 MG/1
80 TABLET, FILM COATED ORAL ONCE
Refills: 0 | Status: DISCONTINUED | OUTPATIENT
Start: 2023-05-02 | End: 2023-05-02

## 2023-05-02 RX ORDER — DULOXETINE HYDROCHLORIDE 30 MG/1
60 CAPSULE, DELAYED RELEASE ORAL DAILY
Refills: 0 | Status: DISCONTINUED | OUTPATIENT
Start: 2023-05-02 | End: 2023-05-03

## 2023-05-02 RX ORDER — ACETAMINOPHEN 500 MG
650 TABLET ORAL EVERY 6 HOURS
Refills: 0 | Status: DISCONTINUED | OUTPATIENT
Start: 2023-05-02 | End: 2023-05-03

## 2023-05-02 RX ORDER — LOSARTAN POTASSIUM 100 MG/1
100 TABLET, FILM COATED ORAL ONCE
Refills: 0 | Status: COMPLETED | OUTPATIENT
Start: 2023-05-02 | End: 2023-05-02

## 2023-05-02 RX ORDER — LANOLIN ALCOHOL/MO/W.PET/CERES
3 CREAM (GRAM) TOPICAL AT BEDTIME
Refills: 0 | Status: DISCONTINUED | OUTPATIENT
Start: 2023-05-02 | End: 2023-05-03

## 2023-05-02 RX ORDER — METOPROLOL TARTRATE 50 MG
25 TABLET ORAL DAILY
Refills: 0 | Status: DISCONTINUED | OUTPATIENT
Start: 2023-05-02 | End: 2023-05-03

## 2023-05-02 RX ORDER — AMIODARONE HYDROCHLORIDE 400 MG/1
100 TABLET ORAL DAILY
Refills: 0 | Status: DISCONTINUED | OUTPATIENT
Start: 2023-05-02 | End: 2023-05-03

## 2023-05-02 RX ORDER — ATORVASTATIN CALCIUM 80 MG/1
80 TABLET, FILM COATED ORAL DAILY
Refills: 0 | Status: DISCONTINUED | OUTPATIENT
Start: 2023-05-02 | End: 2023-05-03

## 2023-05-02 RX ORDER — CEFAZOLIN SODIUM 1 G
2000 VIAL (EA) INJECTION ONCE
Refills: 0 | Status: COMPLETED | OUTPATIENT
Start: 2023-05-02 | End: 2023-05-02

## 2023-05-02 RX ORDER — ASPIRIN/CALCIUM CARB/MAGNESIUM 324 MG
81 TABLET ORAL ONCE
Refills: 0 | Status: COMPLETED | OUTPATIENT
Start: 2023-05-02 | End: 2023-05-02

## 2023-05-02 RX ORDER — CEFAZOLIN SODIUM 1 G
1000 VIAL (EA) INJECTION EVERY 8 HOURS
Refills: 0 | Status: COMPLETED | OUTPATIENT
Start: 2023-05-02 | End: 2023-05-03

## 2023-05-02 RX ORDER — DULOXETINE HYDROCHLORIDE 30 MG/1
60 CAPSULE, DELAYED RELEASE ORAL ONCE
Refills: 0 | Status: DISCONTINUED | OUTPATIENT
Start: 2023-05-02 | End: 2023-05-02

## 2023-05-02 RX ADMIN — CLOPIDOGREL BISULFATE 75 MILLIGRAM(S): 75 TABLET, FILM COATED ORAL at 19:59

## 2023-05-02 RX ADMIN — LOSARTAN POTASSIUM 100 MILLIGRAM(S): 100 TABLET, FILM COATED ORAL at 21:59

## 2023-05-02 RX ADMIN — Medication 100 MILLIGRAM(S): at 21:43

## 2023-05-02 RX ADMIN — Medication 81 MILLIGRAM(S): at 19:59

## 2023-05-02 RX ADMIN — Medication 100 MILLIGRAM(S): at 17:12

## 2023-05-02 RX ADMIN — Medication 3 MILLIGRAM(S): at 21:39

## 2023-05-02 RX ADMIN — ATORVASTATIN CALCIUM 80 MILLIGRAM(S): 80 TABLET, FILM COATED ORAL at 21:59

## 2023-05-02 NOTE — DISCHARGE NOTE PROVIDER - NSDCFUSCHEDAPPT_GEN_ALL_CORE_FT
[facility] Physician [facility]  CARDIOLOGY [address]  Scheduled Appointment: 07/10/2023     Vinod Brown  [facility] Physician [facility]  ELECTROPH [address]  Scheduled Appointment: 06/07/2023    [facility]  CARDIOLOGY [address]  Scheduled Appointment: 07/10/2023     Vinod Brown  [facility] Physician [facility]  ELECTROPH [address]  Scheduled Appointment: 06/21/2023    [facility]  CARDIOLOGY [address]  Scheduled Appointment: 07/10/2023

## 2023-05-02 NOTE — PATIENT PROFILE ADULT - FALL HARM RISK - HARM RISK INTERVENTIONS

## 2023-05-02 NOTE — PRE-ANESTHESIA EVALUATION ADULT - NSRADCARDRESULTSFT_GEN_ALL_CORE
TTE 1/9/23  Summary:   1. Left ventricular ejection fraction, by visual estimation, is 60 to   65%.   2. Normal global left ventricular systolic function.   3. Spectral Doppler shows impaired relaxation pattern of left   ventricular myocardial filling (Grade I diastolic dysfunction).   4. Sclerotic aortic valve with normal opening.   5. Mild mitral regurgitation.   6. Mild dilatation of the ascending aorta.

## 2023-05-02 NOTE — DISCHARGE NOTE PROVIDER - PROVIDER TOKENS
PROVIDER:[TOKEN:[[id number]],FOLLOWUP:[1 month]] PROVIDER:[TOKEN:[[id number]],SCHEDULEDAPPT:[06/07/2023],SCHEDULEDAPPTTIME:[02:00 PM]]

## 2023-05-02 NOTE — DISCHARGE NOTE PROVIDER - CARE PROVIDER_API CALL
Vinod Brown)  Cardiac Electrophysiology; Cardiology  [address] [place] [address]  Phone: [phone]  Fax: [phone]  Follow Up Time: 1 month   Vinod Brown)  Cardiac Electrophysiology; Cardiology  [address] [place] [address]  Phone: [phone]  Fax: [phone]  Scheduled Appointment: 06/07/2023 02:00 PM

## 2023-05-02 NOTE — ASU PATIENT PROFILE, ADULT - FALL HARM RISK - HARM RISK INTERVENTIONS
memory problems/flight risk/Communicate Risk of Fall with Harm to all staff/Move patient closer to nurses' station/Reinforce activity limits and safety measures with patient and family/Reorient to person, place and time as needed/Sit up slowly, dangle for a short time, stand at bedside before walking/Tailored Fall Risk Interventions/Use of alarms - bed, chair and/or voice tab/Visual Cue: Yellow wristband and red socks/Bed in lowest position, wheels locked, appropriate side rails in place/Call bell, personal items and telephone in reach/Instruct patient to call for assistance before getting out of bed or chair/Non-slip footwear when patient is out of bed/[place] to call system/Physically safe environment - no spills, clutter or unnecessary equipment/Purposeful Proactive Rounding/Room/bathroom lighting operational, light cord in reach

## 2023-05-02 NOTE — DISCHARGE NOTE PROVIDER - NSDCMRMEDTOKEN_GEN_ALL_CORE_FT
amiodarone 100 mg oral tablet: 1 tab(s) orally once a day  atorvastatin 80 mg oral tablet: 1 tab(s) orally once a day  DULoxetine 60 mg oral delayed release capsule: 1 cap(s) orally once a day  fluticasone propionate 55 mcg/inh inhalation powder: 1 puff(s) inhaled once a day  losartan 100 mg oral tablet: 1 tab(s) orally once a day  metoprolol succinate 25 mg oral tablet, extended release: 1 tab(s) orally once a day   Adult Aspirin Regimen 81 mg oral delayed release tablet: 1 tab(s) orally once a day  amiodarone 100 mg oral tablet: 1 tab(s) orally once a day  atorvastatin 80 mg oral tablet: 1 tab(s) orally once a day  clopidogrel 75 mg oral tablet: 1 tab(s) orally once a day  DULoxetine 60 mg oral delayed release capsule: 1 cap(s) orally once a day  fluticasone propionate 55 mcg/inh inhalation powder: 1 puff(s) inhaled once a day  losartan 100 mg oral tablet: 1 tab(s) orally once a day  metoprolol succinate 25 mg oral tablet, extended release: 1 tab(s) orally once a day

## 2023-05-02 NOTE — DISCHARGE NOTE PROVIDER - NSDCCPTREATMENT_GEN_ALL_CORE_FT
PRINCIPAL PROCEDURE  Procedure: Occlusion, atrial appendage  Findings and Treatment: - Do not shower for 1 week.  - Do not drive or operate heavy machinery for 48 hours.  - Do not submerge in water (example: baths, swimming) for 1 month.  - Do not lift your left arm greater than shoulder height for 6 weeks.  - Do not lift anything heavier than 5-10 lbs with your left arm for 6 weeks.  - Any sudden swelling, redness, fever, discharge, or severe pain, call the Electrophysiology Office at [phone].     PRINCIPAL PROCEDURE  Procedure: Occlusion, atrial appendage  Findings and Treatment: - Please start taking aspirin 81 mg and plavix 75 mg daily.  - You may shower today.  - Do not drive or operate heavy machinery for 3 days.  - Do not submerge in water (example: baths, swimming) for 1 week.  - No squatting, exertional activities, or lifting anything > 10 lbs for 1 week.  - Any sudden swelling, redness, fever, discharge, or severe pain, call the Electrophysiology Office at [phone].

## 2023-05-02 NOTE — DISCHARGE NOTE PROVIDER - HOSPITAL COURSE
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 On 5/2/23 patient underwent successful Amulet implantation. Patient was monitored overnight. On POD 1 patient remains HD stable with no complaints. Examination of B/L common femoral venous access sites reveal a Clear and dry area with no hematoma or erythema. Patient will continue (Xarelto/Eliquis/Coumadin) for thromboembolic prophylaxis for 45 days with plan to for WALT To evaluate appendage velocites as an outpatient. Patient is being DC home in stable condition. 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 On 5/2/23 patient underwent successful Amulet implantation. Patient was monitored overnight. On POD 1 patient remains HD stable with no complaints. Sutures removed on POD 1 AM. Examination of B/L common femoral venous access sites reveal a Clear and dry area with no hematoma or erythema. Patient will continue (Xarelto/Eliquis/Coumadin) for thromboembolic prophylaxis for 45 days with plan to for WALT To evaluate appendage velocites as an outpatient.???  Patient is being DC home in stable condition. 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 On 5/2/23 patient underwent successful Amulet implantation. Patient was monitored overnight. On POD 1 patient remains HD stable with no complaints. Sutures removed on POD 1 AM. Examination of B/L common femoral venous access sites reveal a Clear and dry area with no hematoma or erythema.   Patient is being DC home in stable condition.

## 2023-05-03 ENCOUNTER — TRANSCRIPTION ENCOUNTER (OUTPATIENT)
Age: 83
End: 2023-05-03

## 2023-05-03 VITALS
DIASTOLIC BLOOD PRESSURE: 78 MMHG | RESPIRATION RATE: 18 BRPM | HEART RATE: 64 BPM | TEMPERATURE: 97 F | OXYGEN SATURATION: 97 % | SYSTOLIC BLOOD PRESSURE: 136 MMHG

## 2023-05-03 PROCEDURE — 99239 HOSP IP/OBS DSCHRG MGMT >30: CPT

## 2023-05-03 PROCEDURE — 93010 ELECTROCARDIOGRAM REPORT: CPT

## 2023-05-03 PROCEDURE — 99233 SBSQ HOSP IP/OBS HIGH 50: CPT | Mod: 24

## 2023-05-03 RX ORDER — ASPIRIN/CALCIUM CARB/MAGNESIUM 324 MG
1 TABLET ORAL
Qty: 30 | Refills: 0
Start: 2023-05-03 | End: 2023-06-01

## 2023-05-03 RX ORDER — BENZOCAINE 10 %
1 GEL (GRAM) MUCOUS MEMBRANE ONCE
Refills: 0 | Status: COMPLETED | OUTPATIENT
Start: 2023-05-03 | End: 2023-05-03

## 2023-05-03 RX ORDER — CLOPIDOGREL BISULFATE 75 MG/1
1 TABLET, FILM COATED ORAL
Qty: 30 | Refills: 2
Start: 2023-05-03 | End: 2023-07-31

## 2023-05-03 RX ADMIN — ATORVASTATIN CALCIUM 80 MILLIGRAM(S): 80 TABLET, FILM COATED ORAL at 11:29

## 2023-05-03 RX ADMIN — Medication 100 MILLIGRAM(S): at 13:17

## 2023-05-03 RX ADMIN — Medication 1 SPRAY(S): at 06:35

## 2023-05-03 RX ADMIN — DULOXETINE HYDROCHLORIDE 60 MILLIGRAM(S): 30 CAPSULE, DELAYED RELEASE ORAL at 11:29

## 2023-05-03 RX ADMIN — AMIODARONE HYDROCHLORIDE 100 MILLIGRAM(S): 400 TABLET ORAL at 05:27

## 2023-05-03 RX ADMIN — Medication 25 MILLIGRAM(S): at 05:28

## 2023-05-03 RX ADMIN — Medication 100 MILLIGRAM(S): at 05:28

## 2023-05-03 RX ADMIN — Medication 81 MILLIGRAM(S): at 11:29

## 2023-05-03 RX ADMIN — CLOPIDOGREL BISULFATE 75 MILLIGRAM(S): 75 TABLET, FILM COATED ORAL at 11:29

## 2023-05-03 NOTE — PROGRESS NOTE ADULT - SUBJECTIVE AND OBJECTIVE BOX
INTERVAL HPI/OVERNIGHT EVENTS:  Occasional PVCs with NSVT overnight, NSR  SP Amulet, POD#1 and feeling well    MEDICATIONS  (STANDING):  aMIOdarone    Tablet 100 milliGRAM(s) Oral daily  aspirin 81 milliGRAM(s) Oral daily  atorvastatin 80 milliGRAM(s) Oral daily  ceFAZolin   IVPB 1000 milliGRAM(s) IV Intermittent every 8 hours  clopidogrel Tablet 75 milliGRAM(s) Oral daily  DULoxetine 60 milliGRAM(s) Oral daily  melatonin 3 milliGRAM(s) Oral at bedtime  metoprolol succinate ER 25 milliGRAM(s) Oral daily    MEDICATIONS  (PRN):  acetaminophen     Tablet .. 650 milliGRAM(s) Oral every 6 hours PRN Mild Pain (1 - 3), Moderate Pain (4 - 6)  QUEtiapine 25 milliGRAM(s) Oral at bedtime PRN Agitation      Allergies    No Known Allergies    Intolerances        REVIEW OF SYSTEMS: No CP, palpitations, dizziness or SOB    Vital Signs Last 24 Hrs  T(C): 36.2 (03 May 2023 07:34), Max: 36.3 (02 May 2023 20:19)  T(F): 97.1 (03 May 2023 07:34), Max: 97.3 (02 May 2023 20:19)  HR: 64 (03 May 2023 07:34) (63 - 67)  BP: 136/78 (03 May 2023 07:34) (124/75 - 138/73)  BP(mean): 101 (03 May 2023 07:34) (93 - 101)  RR: 18 (03 May 2023 07:34) (18 - 20)  SpO2: 97% (03 May 2023 07:34) (97% - 99%)    Parameters below as of 02 May 2023 20:19  Patient On (Oxygen Delivery Method): room air        05-02-23 @ 07:01  -  05-03-23 @ 07:00  --------------------------------------------------------  IN: 420 mL / OUT: 300 mL / NET: 120 mL        Physical Exam  GENERAL: In no apparent distress, well nourished, and hydrated.  EYES: EOMI, PERRLA, conjunctiva and sclera clear  NECK: Supple  HEART: Regular rate and rhythm; No murmurs, rubs, or gallops.  PULMONARY: Clear to auscultation and perfusion.  No rales, wheezing, or rhonchi bilaterally.  EXTREMITIES:  2+ Peripheral Pulses, No clubbing, cyanosis, or edema  SKIN: Sutures removed, groins healing well BL  NEUROLOGICAL: Grossly nonfocal    LABS:                RADIOLOGY & ADDITIONAL TESTS:

## 2023-05-03 NOTE — PHYSICAL THERAPY INITIAL EVALUATION ADULT - STANDING BALANCE: DYNAMIC, REHAB EVAL
fair plus Metronidazole Counseling:  I discussed with the patient the risks of metronidazole including but not limited to seizures, nausea/vomiting, a metallic taste in the mouth, nausea/vomiting and severe allergy.

## 2023-05-03 NOTE — PROGRESS NOTE ADULT - ASSESSMENT
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 Patient sp Amulet POD#1 and feeling well    Impression:  SP Amulet Implant  PAF  Dementia  Dilated CM sp AICD   HTN  HLD    Plan:  - Continue ASA 81mg and Plavix 75mg  - Discontinue Eliquis/Xarelto  - Continue all other home medications  - No heavy lifting or squating for one week  - Follow up with Dr Brown in one month

## 2023-05-03 NOTE — DISCHARGE NOTE NURSING/CASE MANAGEMENT/SOCIAL WORK - NSDCPEFALRISK_GEN_ALL_CORE
For information on Fall & Injury Prevention, visit: [email or web address] OR  [email or web address] OR  https://www.cdc.gov/steadi/patient.html

## 2023-05-03 NOTE — DISCHARGE NOTE NURSING/CASE MANAGEMENT/SOCIAL WORK - PATIENT PORTAL LINK FT
You can access the FollowMyHealth Patient Portal offered by [facility] by registering at the following website: [email or web address]. By joining [employer]’s FollowMyHealth portal, you will also be able to view your health information using other applications (apps) compatible with our system.

## 2023-05-03 NOTE — PHYSICAL THERAPY INITIAL EVALUATION ADULT - GENERAL OBSERVATIONS, REHAB EVAL
[id number] pm. 83 y/o M received in bed, left in b/s chair, nad, 1:1 sit and dtr present. pt is alert, oriented to self, following VC's with encouragement. pt transferred and ambulated with supervision. vitals: at rest 130/67 63 98% on RA, post amb 144/72 63 100% on RA.

## 2023-05-03 NOTE — PHYSICAL THERAPY INITIAL EVALUATION ADULT - NSACTIVITYREC_GEN_A_PT
made some recommendations to dtr for dementia-related safety products, including ID bracelets, GPS locators, auto pill dispensers.

## 2023-05-03 NOTE — DISCHARGE NOTE NURSING/CASE MANAGEMENT/SOCIAL WORK - NSTOBACCONEVERSMOKERY/N_GEN_A
[facility] - Endoscopy  Colorectal Surgery  History & Physical    Patient Name: Abel Hackett  MRN: [id number]  Admission Date: 8/2/2022  Attending Physician: Keira Wheat MD   Primary Care Provider: Keira Wheat MD  Subjective:    Abel Hackett is a 52 y.o. male here for colonoscopy after + cologuard testing    Past Medical History:   Diagnosis Date    Depression     Diabetes mellitus     Diabetes mellitus, type 2      Past Surgical History:   Procedure Laterality Date    REFRACTIVE SURGERY  4/13/2016    VASECTOMY       Family History   Problem Relation Age of Onset    Heart disease Mother     Hypertension Mother     Diabetes Mother     Heart attack Mother     Heart disease Father     Hypertension Father     Diabetes Father     Heart attack Father     Heart attack Maternal Uncle     Heart attack Maternal Grandfather      Social History     Tobacco Use    Smoking status: Never Smoker    Smokeless tobacco: Never Used   Substance Use Topics    Alcohol use: Not Currently     Comment: occassional    Drug use: No       PTA Medications   Medication Sig    amitriptyline (ELAVIL) 25 MG tablet Take 1 tablet (25 mg total) by mouth nightly as needed for Insomnia.    aspirin (ECOTRIN) 81 MG EC tablet Take 1 tablet (81 mg total) by mouth once daily.    atorvastatin (LIPITOR) 40 MG tablet Take 1 tablet (40 mg total) by mouth once daily.    dapagliflozin (FARXIGA) 10 mg tablet Take 1 tablet (10 mg total) by mouth once daily.    empagliflozin-metformin (SYNJARDY XR) 25-1,000 mg TBph Take 1 tablet by mouth once daily at 6am.    pantoprazole (PROTONIX) 40 MG tablet Take 1 tablet (40 mg total) by mouth once daily.    semaglutide (OZEMPIC) 1 mg/dose (4 mg/3 mL) Inject 1 mg into the skin every 7 days.    sildenafil (VIAGRA) 100 MG tablet Take 1 tablet (100 mg total) by mouth as needed for Erectile Dysfunction.    venlafaxine (EFFEXOR-XR) 37.5 MG 24 hr capsule Take 1 capsule (37.5 mg total) by mouth once  daily.       Review of patient's allergies indicates:  No Known Allergies    Review of Systems   Constitutional: Negative for fever and chills.   HENT: Negative for hearing loss.    Eyes: Negative for blurred vision and double vision.   Respiratory: Negative for cough and shortness of breath.    Cardiovascular: Negative for chest pain and palpitations.   Gastrointestinal: Negative for heartburn, nausea, vomiting and abdominal pain.   Genitourinary: Negative for dysuria and frequency.   Musculoskeletal: Negative for myalgias, joint pain and falls.   Skin: Negative for itching and rash.   Neurological: Negative for dizziness, tingling and headaches.   Endo/Heme/Allergies: Does not bruise/bleed easily.   Psychiatric/Behavioral: Negative for depression and suicidal ideas.       Objective:        Temp:  [97.3 °F (36.3 °C)]   Pulse:  [92]   Resp:  [16]   BP: (151)/(84)   SpO2:  [98 %]       Physical Exam   Constitutional: He is oriented to person, place, and time. He appears well-developed and well-nourished.   HENT:   Head: Normocephalic and atraumatic.   Right Ear: External ear normal.   Left Ear: External ear normal.   Nose: Nose normal.   Mouth/Throat: Oropharynx is clear and moist.   Eyes: Conjunctivae normal and EOM are normal. Pupils are equal, round, and reactive to light. Right eye exhibits no discharge. Left eye exhibits no discharge. No scleral icterus.   Neck: Normal range of motion. Neck supple. No JVD present. No tracheal deviation present. No thyromegaly present.   Cardiovascular: Normal rate, regular rhythm, normal heart sounds and intact distal pulses.    No murmur heard.  Pulmonary/Chest: Effort normal and breath sounds normal. No respiratory distress. He has no wheezes. He has no rales. He exhibits no tenderness.   Abdominal: Soft. Bowel sounds are normal. He exhibits no distension and no mass. There is no tenderness. There is no rebound and no guarding.   Musculoskeletal: Normal range of motion.    Lymphadenopathy:     He has no cervical adenopathy.   Neurological: He is alert and oriented to person, place, and time. He has normal reflexes. No cranial nerve deficit. He exhibits normal muscle tone. Coordination normal.   Skin: Skin is warm and dry.   Psychiatric: He has a normal mood and affect. His behavior is normal. Judgment and thought content normal.           Assessment:      Active Hospital Problems    Diagnosis  POA    Positive colorectal cancer screening using Cologuard test [R19.5]  Yes      Resolved Hospital Problems   No resolved problems to display.         Plan:    ASA grade 2. Proceed with MAC for colonoscopy    Patient cleared for Anesthesia:  MAC and general    Anesthesia/Surgery risks, benefits, and alternative options discussed and understood by patient/family.        Assessment/Plan:     Positive colorectal cancer screening using Cologuard test  Colon cancer screening today.          Keira Wheat MD  Colorectal Surgery  [facility]   Yes

## 2023-06-21 ENCOUNTER — APPOINTMENT (OUTPATIENT)
Dept: ELECTROPHYSIOLOGY | Facility: CLINIC | Age: 83
End: 2023-06-21
Payer: MEDICARE

## 2023-06-21 VITALS
SYSTOLIC BLOOD PRESSURE: 126 MMHG | HEIGHT: 74 IN | HEART RATE: 84 BPM | OXYGEN SATURATION: 95 % | BODY MASS INDEX: 24.51 KG/M2 | WEIGHT: 191 LBS | DIASTOLIC BLOOD PRESSURE: 70 MMHG

## 2023-06-21 PROCEDURE — 93290 INTERROG DEV EVAL ICPMS IP: CPT | Mod: 26

## 2023-06-21 PROCEDURE — 99215 OFFICE O/P EST HI 40 MIN: CPT | Mod: 24

## 2023-06-21 PROCEDURE — 93283 PRGRMG EVAL IMPLANTABLE DFB: CPT

## 2023-06-21 PROCEDURE — 93000 ELECTROCARDIOGRAM COMPLETE: CPT | Mod: 59

## 2023-06-21 NOTE — PROCEDURE
[No] : not [NSR] : normal sinus rhythm [See Scanned Paceart Report] : See scanned paceart report [See Device Printout] : See device printout [ICD] : Implantable cardioverter-defibrillator [VDI] : VDI [Voltage: ___ volts] : Voltage was [unfilled] volts [Charge Time: ___ sec] : charge time was [unfilled] seconds [Longevity: ___ months] : The estimated remaining battery life is [unfilled] months [Normal] : The battery status is normal. [Threshold Testing Performed] : Threshold testing was performed [Ventricular] : Ventricular [Lead Imp:  ___ohms] : lead impedance was [unfilled] ohms [Sensing Amplitude ___mv] : sensing amplitude was [unfilled] mv [___V @] : [unfilled] V [___ ms] : [unfilled] ms [None] : none [Auto Capture "On"] : auto capture was switched "On" [Counters Reset] : the counters were reset [Pace ___ %] : Pace [unfilled]% [de-identified] : 75 bpm [de-identified] : pocketfungamesk [de-identified] : Intica Akira  [de-identified] : [id number] [de-identified] : 04/03/2023 [de-identified] : 40 [de-identified] : Thoracic Impedance 59 ohms stable [de-identified] : Normal Function, chronically high V Threshold.

## 2023-06-21 NOTE — ASSESSMENT
[FreeTextEntry1] : ## Dilated Nonischemic cardiomyopathy s/p SC-ICD (VDD, Biotronik, 14, Non-dep)\par ## SVT\par ## Paroxysmal AF\par \par - ICD interrogation shows normally functioning DC-ICD. Battery life normal. Optivol below threshold. + AF episodes.\par - Discussed different management option including clinical observation +/- medication and ablation. After detailed discussion, patient opted for conservative management. Patient will contact us if more episodes or changes the mind. \par - Continue Amiodarone.\par - CHADSVASC 4+ (Age, Cardiomyopathy, HTN).\par - S/p amulet. Unclear if he is taking his medications, especially Clopidogrel. \par - Will continue with Aspirin and Clopidogrel as best as we can. \par - WALT in approximately 15 days. \par - Remote monitoring. \par - Return in 6 months. \par \par

## 2023-06-21 NOTE — REASON FOR VISIT
Patient here for trigger point injections. Patient taken back to exam room, and placed on drape locking stool. Areas to be injected marked appropriately, and cleansed with alcohol. 23cc of 1% Lidocaine, and 1cc of B12 to be injected by provider. [Follow-up Device Check] : is here today for a follow-up device check visit for [Arrhythmias (seen on stored data)] : arrhythmias seen on stored data [MOON (Elective Replacement Indication)] : elective replacement indication [Family Member] : family member

## 2023-06-21 NOTE — ADDENDUM
[FreeTextEntry1] : ILeesa assisted in documentation on 06/21/2023 acting as a scribe for Dr. Vinod Brown.\par \par

## 2023-06-21 NOTE — HISTORY OF PRESENT ILLNESS
[de-identified] : The patient noted the following symptom(s):. I had a pleasure of seeing Mr. WAITE for follow-up consultation for ICD care. He was previously being followed by Dr. Mao.\par \par Mr. 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 Mild HERNANDEZ. Occasional palpitations when hes doing his 20 lbs dumbbells.\par He appears to be very happy.\par Didn't bring meds or remember them.\par 08/18/22: Feels fine. He is accompanied by his daughter. +SVT episodes on ICD. More forgetful.\par 02/22/2023: Feels fine. He is accompanied by daughter. Daughter states he is more forgetful. Demented. Aggressive at times. Does not allow any protective services or support systems in his house. \par 3/22/23: Feels fine. Accompanied by daughter. \par 06/21/2023: Feels fine. Accompanied by daughter. Unclear if he is compliant with his medication. Refused visiting RN.\par \par Denies chest pain, shortness of breath, palpitation, dizziness or LOC except noted above.\par \par EKG (06/21/2023): SR @ 84\par EKG (3/22/23): SR.\par EKG (02/22/2023): SR@90 bpm, PVCs. \par EKG (08/18/22): SR\par EKG (10/20): SR@71, PVC (RVOT?)\par EKG: SR@ 57/min, QRSd 90 ms,  ms\par TTE (19): EF 55% (?)\par Cardio: Dr. Paul

## 2023-08-01 ENCOUNTER — OUTPATIENT (OUTPATIENT)
Dept: OUTPATIENT SERVICES | Facility: HOSPITAL | Age: 83
LOS: 1 days | End: 2023-08-01
Payer: MEDICARE

## 2023-08-01 VITALS
RESPIRATION RATE: 18 BRPM | TEMPERATURE: 98 F | OXYGEN SATURATION: 100 % | SYSTOLIC BLOOD PRESSURE: 148 MMHG | DIASTOLIC BLOOD PRESSURE: 67 MMHG | WEIGHT: 185.41 LBS | HEART RATE: 58 BPM

## 2023-08-01 DIAGNOSIS — I48.19 OTHER PERSISTENT ATRIAL FIBRILLATION: ICD-10-CM

## 2023-08-01 DIAGNOSIS — Z98.890 OTHER SPECIFIED POSTPROCEDURAL STATES: Chronic | ICD-10-CM

## 2023-08-01 DIAGNOSIS — Z01.818 ENCOUNTER FOR OTHER PREPROCEDURAL EXAMINATION: ICD-10-CM

## 2023-08-01 DIAGNOSIS — Z95.810 PRESENCE OF AUTOMATIC (IMPLANTABLE) CARDIAC DEFIBRILLATOR: Chronic | ICD-10-CM

## 2023-08-01 LAB
ALBUMIN SERPL ELPH-MCNC: 4.6 G/DL — SIGNIFICANT CHANGE UP (ref 3.5–5.2)
ALP SERPL-CCNC: 95 U/L — SIGNIFICANT CHANGE UP (ref 30–115)
ALT FLD-CCNC: 19 U/L — SIGNIFICANT CHANGE UP (ref 0–41)
ANION GAP SERPL CALC-SCNC: 16 MMOL/L — HIGH (ref 7–14)
APTT BLD: 30.9 SEC — SIGNIFICANT CHANGE UP (ref 27–39.2)
AST SERPL-CCNC: 11 U/L — SIGNIFICANT CHANGE UP (ref 0–41)
BASOPHILS # BLD AUTO: 0.08 K/UL — SIGNIFICANT CHANGE UP (ref 0–0.2)
BASOPHILS NFR BLD AUTO: 1 % — SIGNIFICANT CHANGE UP (ref 0–1)
BILIRUB SERPL-MCNC: 0.7 MG/DL — SIGNIFICANT CHANGE UP (ref 0.2–1.2)
BUN SERPL-MCNC: 20 MG/DL — SIGNIFICANT CHANGE UP (ref 10–20)
CALCIUM SERPL-MCNC: 9.4 MG/DL — SIGNIFICANT CHANGE UP (ref 8.4–10.5)
CHLORIDE SERPL-SCNC: 103 MMOL/L — SIGNIFICANT CHANGE UP (ref 98–110)
CO2 SERPL-SCNC: 22 MMOL/L — SIGNIFICANT CHANGE UP (ref 17–32)
CREAT SERPL-MCNC: 1.1 MG/DL — SIGNIFICANT CHANGE UP (ref 0.7–1.5)
EGFR: 68 ML/MIN/1.73M2 — SIGNIFICANT CHANGE UP
EOSINOPHIL # BLD AUTO: 0.18 K/UL — SIGNIFICANT CHANGE UP (ref 0–0.7)
EOSINOPHIL NFR BLD AUTO: 2.3 % — SIGNIFICANT CHANGE UP (ref 0–8)
GLUCOSE SERPL-MCNC: 83 MG/DL — SIGNIFICANT CHANGE UP (ref 70–99)
HCT VFR BLD CALC: 44.1 % — SIGNIFICANT CHANGE UP (ref 42–52)
HGB BLD-MCNC: 14.4 G/DL — SIGNIFICANT CHANGE UP (ref 14–18)
IMM GRANULOCYTES NFR BLD AUTO: 0.4 % — HIGH (ref 0.1–0.3)
INR BLD: 0.97 RATIO — SIGNIFICANT CHANGE UP (ref 0.65–1.3)
LYMPHOCYTES # BLD AUTO: 1.76 K/UL — SIGNIFICANT CHANGE UP (ref 1.2–3.4)
LYMPHOCYTES # BLD AUTO: 22.7 % — SIGNIFICANT CHANGE UP (ref 20.5–51.1)
MCHC RBC-ENTMCNC: 27.9 PG — SIGNIFICANT CHANGE UP (ref 27–31)
MCHC RBC-ENTMCNC: 32.7 G/DL — SIGNIFICANT CHANGE UP (ref 32–37)
MCV RBC AUTO: 85.5 FL — SIGNIFICANT CHANGE UP (ref 80–94)
MONOCYTES # BLD AUTO: 0.73 K/UL — HIGH (ref 0.1–0.6)
MONOCYTES NFR BLD AUTO: 9.4 % — HIGH (ref 1.7–9.3)
NEUTROPHILS # BLD AUTO: 4.98 K/UL — SIGNIFICANT CHANGE UP (ref 1.4–6.5)
NEUTROPHILS NFR BLD AUTO: 64.2 % — SIGNIFICANT CHANGE UP (ref 42.2–75.2)
NRBC # BLD: 0 /100 WBCS — SIGNIFICANT CHANGE UP (ref 0–0)
PLATELET # BLD AUTO: 321 K/UL — SIGNIFICANT CHANGE UP (ref 130–400)
PMV BLD: 9.8 FL — SIGNIFICANT CHANGE UP (ref 7.4–10.4)
POTASSIUM SERPL-MCNC: 4.8 MMOL/L — SIGNIFICANT CHANGE UP (ref 3.5–5)
POTASSIUM SERPL-SCNC: 4.8 MMOL/L — SIGNIFICANT CHANGE UP (ref 3.5–5)
PROT SERPL-MCNC: 6.1 G/DL — SIGNIFICANT CHANGE UP (ref 6–8)
PROTHROM AB SERPL-ACNC: 11.1 SEC — SIGNIFICANT CHANGE UP (ref 9.95–12.87)
RBC # BLD: 5.16 M/UL — SIGNIFICANT CHANGE UP (ref 4.7–6.1)
RBC # FLD: 14.4 % — SIGNIFICANT CHANGE UP (ref 11.5–14.5)
SODIUM SERPL-SCNC: 141 MMOL/L — SIGNIFICANT CHANGE UP (ref 135–146)
WBC # BLD: 7.76 K/UL — SIGNIFICANT CHANGE UP (ref 4.8–10.8)
WBC # FLD AUTO: 7.76 K/UL — SIGNIFICANT CHANGE UP (ref 4.8–10.8)

## 2023-08-01 PROCEDURE — 85025 COMPLETE CBC W/AUTO DIFF WBC: CPT

## 2023-08-01 PROCEDURE — 36415 COLL VENOUS BLD VENIPUNCTURE: CPT

## 2023-08-01 PROCEDURE — 85610 PROTHROMBIN TIME: CPT

## 2023-08-01 PROCEDURE — 93010 ELECTROCARDIOGRAM REPORT: CPT

## 2023-08-01 PROCEDURE — 80053 COMPREHEN METABOLIC PANEL: CPT

## 2023-08-01 PROCEDURE — 93005 ELECTROCARDIOGRAM TRACING: CPT

## 2023-08-01 PROCEDURE — 85730 THROMBOPLASTIN TIME PARTIAL: CPT

## 2023-08-01 PROCEDURE — 99214 OFFICE O/P EST MOD 30 MIN: CPT | Mod: 25

## 2023-08-01 RX ORDER — ATORVASTATIN CALCIUM 80 MG/1
1 TABLET, FILM COATED ORAL
Qty: 0 | Refills: 0 | DISCHARGE

## 2023-08-01 RX ORDER — DULOXETINE HYDROCHLORIDE 30 MG/1
1 CAPSULE, DELAYED RELEASE ORAL
Qty: 0 | Refills: 0 | DISCHARGE

## 2023-08-01 RX ORDER — FLUTICASONE PROPIONATE 220 MCG
1 AEROSOL WITH ADAPTER (GRAM) INHALATION
Refills: 0 | DISCHARGE

## 2023-08-01 RX ORDER — AMIODARONE HYDROCHLORIDE 400 MG/1
1 TABLET ORAL
Refills: 0 | DISCHARGE

## 2023-08-01 RX ORDER — LOSARTAN POTASSIUM 100 MG/1
1 TABLET, FILM COATED ORAL
Refills: 0 | DISCHARGE

## 2023-08-01 RX ORDER — METOPROLOL TARTRATE 50 MG
1 TABLET ORAL
Refills: 0 | DISCHARGE

## 2023-08-01 NOTE — H&P PST ADULT - GENITOURINARY MALE
normal external genitalia/no hernia/no discharge/no mass/no tenderness/no ulcer/normal/no penile lesion/no palpable testicular mass/no scrotal mass General

## 2023-08-01 NOTE — H&P PST ADULT - HISTORY OF PRESENT ILLNESS
Patient is a 82 year old male presenting to PAST in preparation for   WALT POST AMULET on 8/8  under sedation  anesthesia by Dr. Lentz  Pt was accompanied by his daughter pt with h/o senile dementia history obtained from daughter. Pt with history os a-fib had amulet procedure done in May 2023, scheduled for above  PATIENT CURRENTLY DENIES CHEST PAIN  SHORTNESS OF BREATH  PALPITATIONS,  DYSURIA, OR UPPER RESPIRATORY INFECTION IN PAST 2 WEEKS  EXERCISE  TOLERANCE  1-2 FLIGHT OF STAIRS  WITHOUT SHORTNESS OF BREATH    Anesthesia Alert  NO--Difficult Airway  NO--History of neck surgery or radiation  NO--Limited ROM of neck  NO--History of Malignant hyperthermia  NO--Personal or family history of Pseudocholinesterase deficiency  NO--Prior Anesthesia Complication  NO--Latex Allergy  NO--Loose teeth  NO--History of Rheumatoid Arthritis  NO--BOAZ  NO-- BLEEDING RISK  NO--Other_____    As per patient, this is their complete medical and surgical history, including medications both prescribed or over the counter.  Patient verbalized understanding of instructions and was given the opportunity to ask questions and have them answered.

## 2023-08-02 DIAGNOSIS — Z01.818 ENCOUNTER FOR OTHER PREPROCEDURAL EXAMINATION: ICD-10-CM

## 2023-08-02 DIAGNOSIS — I48.19 OTHER PERSISTENT ATRIAL FIBRILLATION: ICD-10-CM

## 2023-08-08 ENCOUNTER — OUTPATIENT (OUTPATIENT)
Dept: OUTPATIENT SERVICES | Facility: HOSPITAL | Age: 83
LOS: 1 days | End: 2023-08-08

## 2023-08-08 DIAGNOSIS — Z98.890 OTHER SPECIFIED POSTPROCEDURAL STATES: Chronic | ICD-10-CM

## 2023-08-08 DIAGNOSIS — Z95.810 PRESENCE OF AUTOMATIC (IMPLANTABLE) CARDIAC DEFIBRILLATOR: Chronic | ICD-10-CM

## 2023-08-08 DIAGNOSIS — I48.19 OTHER PERSISTENT ATRIAL FIBRILLATION: ICD-10-CM

## 2023-08-08 RX ORDER — VALSARTAN 80 MG/1
0 TABLET ORAL
Refills: 0 | DISCHARGE

## 2023-08-08 RX ORDER — METOPROLOL TARTRATE 50 MG
1 TABLET ORAL
Refills: 0 | DISCHARGE

## 2023-08-08 RX ORDER — ATORVASTATIN CALCIUM 80 MG/1
1 TABLET, FILM COATED ORAL
Refills: 0 | DISCHARGE

## 2023-08-08 RX ORDER — AMIODARONE HYDROCHLORIDE 400 MG/1
1 TABLET ORAL
Refills: 0 | DISCHARGE

## 2023-08-08 NOTE — ASU PATIENT PROFILE, ADULT - FALL HARM RISK - HARM RISK INTERVENTIONS

## 2023-08-08 NOTE — ASU PATIENT PROFILE, ADULT - PACKS YRS CALCULATION
Pt presents to clinic for difficulty urinating    Review of Systems:    Weight loss:    No     Recent fever/chills:  No   Night sweats:   No  Current skin rash:  No   Recent hair loss:  No  Heat intolerance:  No   Cold intolerance:  No  Chest pain:   No   Palpitations:   No  Shortness of breath:  No   Wheezing:   No  Constipation:    No   Diarrhea:   No   Nausea:   No   Vomiting:   No   Kidney/side pain:  No   Back pain:   No  Frequent headaches:  No   Dizziness:     No  Leg swelling:   Yes   Calf pain:    No        
12

## 2023-08-09 DIAGNOSIS — I48.19 OTHER PERSISTENT ATRIAL FIBRILLATION: ICD-10-CM

## 2023-08-11 NOTE — ED ADULT NURSE NOTE - NS ED PATIENT SAFETY CONCERN
Caller: DAMON OSBORNE    Relationship: SELF     Best call back number: [phone]    Requested Prescriptions: Continuous Blood Gluc Transmit (Dexcom G6 Transmitter) misc ,Continuous Blood Gluc Sensor (Dexcom G6 Sensor) ,NovoLOG 100 UNIT/ML injection AND Insulin Disposable Pump (Omnipod 5 G6 Pod, Gen 5,) misc   Requested Prescriptions      No prescriptions requested or ordered in this encounter        Pharmacy where request should be sent:      Last office visit with prescribing clinician: 5/24/2023   Last telemedicine visit with prescribing clinician: Visit date not found   Next office visit with prescribing clinician: 9/26/2023     Additional details provided by patient:     Does the patient have less than a 3 day supply:  [x] Yes  [] No    Would you like a call back once the refill request has been completed: [x] Yes [] No    If the office needs to give you a call back, can they leave a voicemail: [x] Yes [] No    Paulette Carmichael   08/11/23 15:54 EDT         
No

## 2023-08-16 ENCOUNTER — OUTPATIENT (OUTPATIENT)
Dept: OUTPATIENT SERVICES | Facility: HOSPITAL | Age: 83
LOS: 1 days | End: 2023-08-16
Payer: MEDICARE

## 2023-08-16 DIAGNOSIS — Z95.810 PRESENCE OF AUTOMATIC (IMPLANTABLE) CARDIAC DEFIBRILLATOR: Chronic | ICD-10-CM

## 2023-08-16 DIAGNOSIS — I48.19 OTHER PERSISTENT ATRIAL FIBRILLATION: ICD-10-CM

## 2023-08-16 DIAGNOSIS — Z98.890 OTHER SPECIFIED POSTPROCEDURAL STATES: Chronic | ICD-10-CM

## 2023-08-16 DIAGNOSIS — Z95.818 PRESENCE OF OTHER CARDIAC IMPLANTS AND GRAFTS: Chronic | ICD-10-CM

## 2023-08-16 PROCEDURE — 93320 DOPPLER ECHO COMPLETE: CPT | Mod: 26

## 2023-08-16 PROCEDURE — 93312 ECHO TRANSESOPHAGEAL: CPT | Mod: 26,XU

## 2023-08-16 PROCEDURE — 93325 DOPPLER ECHO COLOR FLOW MAPG: CPT | Mod: 26

## 2023-08-16 PROCEDURE — 93312 ECHO TRANSESOPHAGEAL: CPT

## 2023-08-16 PROCEDURE — 93320 DOPPLER ECHO COMPLETE: CPT

## 2023-08-16 PROCEDURE — 93325 DOPPLER ECHO COLOR FLOW MAPG: CPT

## 2023-08-16 NOTE — H&P CARDIOLOGY - HISTORY OF PRESENT ILLNESS
82 YO M with PMHx of dementia (AAOx2) HTN, HLD, dilated cardiomyopathy suspected due to excessive alcohol use s/p ICD (Biotronik), PAF s/p Amulet (05/02/2023) who presented for WALT post amulet.  Denies any active symptoms. Collateral history obtained from the daughter at bedside.     HPI    REVIEW OF SYSTEMS:  CONSTITUTIONAL: No weakness  EYES/ENT: No visual changes  NECK: No pain or stiffness  RESPIRATORY: No cough, wheezing, hemoptysis;  CARDIOVASCULAR: SEE HPI  GASTROINTESTINAL: No abdominal or epigastric pain.  GENITOURINARY: No dysuria, frequency or hematuria  NEUROLOGICAL: No numbness or weakness  SKIN: No itching, rashes      PHYSICAL EXAM:  T(C): --  HR: --  BP: --  RR: --  SpO2: --  GENERAL: NAD  HEAD:  Atraumatic, Normocephalic  EYES: conjunctiva and sclera clear  NECK: No JVD  CHEST/LUNG: Dec BS  HEART: Irregular   ABDOMEN: Soft, Nontender, Nondistended;  EXTREMITIES:  No clubbing, cyanosis, or edema  NEUROLOGY:  A&Ox1  SKIN: No rashes or lesions

## 2023-08-16 NOTE — H&P CARDIOLOGY - NSICDXPASTMEDICALHX_GEN_ALL_CORE_FT
PAST MEDICAL HISTORY:  A-fib     Dementia     H/O inguinal hernia repair     High cholesterol     Hypertension

## 2023-08-16 NOTE — CHART NOTE - NSCHARTNOTEFT_GEN_A_CORE
POST OPERATIVE PROCEDURAL DOCUMENTATION  PRE-OP DIAGNOSIS: paroxymal A fib s/p Amulet     POST-OP DIAGNOSIS: paroxymal A fib s/p Amulet with no leak    PROCEDURE: Transesophageal Echocardiogram    Primary Physician: Dr. Lentz  Cardiology Fellow: Dr. Atkins    ANESTHESIA TYPE  [  ] General Anesthesia  [ x ] Conscious Sedation  [  ] Local/Regional    CONDITION  [  ] Critical  [  ] Serious  [  ] Fair  [ x ] Good    SPECIMENS REMOVED (IF APPLICABLE): N/A    IMPLANTS (IF APPLICABLE): None    ESTIMATED BLOOD LOSS: None    COMPLICATIONS: None    After risks and benefits of procedures were explained, informed consent was obtained and placed in chart.   The patient received topical anesthetic to the oropharynx with viscous lidocaine and benzocaine spray.  Refer to Anesthesia note for sedation details.  The WALT probe was passed into the esophagus without difficulty.  Transesophageal and transgastric images were obtained.  The WALT probe was removed without difficulty and examined.  There was no evidence for bleeding.  The patient tolerated the procedure well without any immediate WALT-related complications.      Preliminary Findings:  LA: Mildly enlarged  AVEL: Amulet in AVEL. No leak noted around the device.  LV: LVEF was estimated at 40%.  MV: Mild MR  AV: No AI, no AS  RA: Mildly enlarged  RV: Normal size and function  TV: No TR  PV: No FL, no PS  IAS: No PFO or ASD. No R-> L shunt   Aorta: There was mild, non-mobile atheroma seen in the thoracic aorta.      DIAGNOSIS/IMPRESSION: s/p successful implantation of Amulet with no leak     Full report to follow    PLAN OF CARE:  [x] Discharge home when stable and fully awake.  [x] No eating or drinking for 1 hour  [x] No driving for 24 hours    Results of procedure/ plan of care discussed with patient/  in detail. POST OPERATIVE PROCEDURAL DOCUMENTATION  PRE-OP DIAGNOSIS: paroxymal A fib s/p Amulet     POST-OP DIAGNOSIS: paroxymal A fib s/p Amulet with no leak    PROCEDURE: Transesophageal Echocardiogram    Primary Physician: Dr. Lentz  Cardiology Fellow: Dr. Atkins    ANESTHESIA TYPE  [  ] General Anesthesia  [ x ] Conscious Sedation  [  ] Local/Regional    CONDITION  [  ] Critical  [  ] Serious  [  ] Fair  [ x ] Good    SPECIMENS REMOVED (IF APPLICABLE): N/A    IMPLANTS (IF APPLICABLE): None    ESTIMATED BLOOD LOSS: None    COMPLICATIONS: None    After risks and benefits of procedures were explained, informed consent was obtained and placed in chart.   The patient received topical anesthetic to the oropharynx with viscous lidocaine and benzocaine spray.  Refer to Anesthesia note for sedation details.  The WALT probe was passed into the esophagus without difficulty.  Transesophageal and transgastric images were obtained.  The WALT probe was removed without difficulty and examined.  There was no evidence for bleeding.  The patient tolerated the procedure well without any immediate WALT-related complications.      Preliminary Findings:  LA: Mildly enlarged  AVEL: Amulet in AVEL. No leak noted around the device.  LV: LVEF was estimated at 40% - 45%.  MV: Mild MR  AV: No AI, no AS  RA: Mildly enlarged  RV: Normal size and function  TV: No TR  PV: No LA, no PS  IAS: No PFO or ASD. No R-> L shunt   Aorta: There was mild, non-mobile atheroma seen in the thoracic aorta.      DIAGNOSIS/IMPRESSION: s/p successful implantation of Amulet with no leak     Full report to follow    PLAN OF CARE:  [x] Discharge home when stable and fully awake.  [x] No eating or drinking for 1 hour  [x] No driving for 24 hours    Results of procedure/ plan of care discussed with patient/  in detail. POST OPERATIVE PROCEDURAL DOCUMENTATION  PRE-OP DIAGNOSIS: paroxymal A fib s/p Amulet     POST-OP DIAGNOSIS: paroxymal A fib s/p Amulet with no leak    PROCEDURE: Transesophageal Echocardiogram    Primary Physician: Dr. Lentz  Cardiology Fellow: Dr. Atkins    ANESTHESIA TYPE  [  ] General Anesthesia  [ x ] Conscious Sedation  [  ] Local/Regional    CONDITION  [  ] Critical  [  ] Serious  [  ] Fair  [ x ] Good    SPECIMENS REMOVED (IF APPLICABLE): N/A    IMPLANTS (IF APPLICABLE): None    ESTIMATED BLOOD LOSS: None    COMPLICATIONS: None    After risks and benefits of procedures were explained, informed consent was obtained and placed in chart.   The patient received topical anesthetic to the oropharynx with viscous lidocaine and benzocaine spray.  Refer to Anesthesia note for sedation details.  The WALT probe was passed into the esophagus without difficulty.  Transesophageal and transgastric images were obtained.  The WALT probe was removed without difficulty and examined.  There was no evidence for bleeding.  The patient tolerated the procedure well without any immediate WALT-related complications.      Preliminary Findings:  te< from: Transesophageal Echocardiogram (08.16.23 @ 07:56) >     1. Left ventricular ejection fraction, by visual estimation, is 40 to   45%.   2. Mildly decreased global left ventricular systolic function.   3. Mild mitral valve regurgitation.   4. Aortic root measured at 3.8 cm.   5. AVEL occlusion device (amulet) device is in appropriate position with   no thrombus or chase-device leak.    < end of copied text >          DIAGNOSIS/IMPRESSION: s/p successful implantation of Amulet with no leak     Full report to follow    PLAN OF CARE:  [x] Discharge home when stable and fully awake.  [x] No eating or drinking for 1 hour  [x] No driving for 24 hours    Results of procedure/ plan of care discussed with patient/  in detail.

## 2023-08-16 NOTE — H&P CARDIOLOGY - NSICDXPASTSURGICALHX_GEN_ALL_CORE_FT
PAST SURGICAL HISTORY:  AICD (automatic cardioverter/defibrillator) present     History of lumbar surgery     Presence of Watchman left atrial appendage closure device

## 2023-08-16 NOTE — ASU PATIENT PROFILE, ADULT - FALL HARM RISK - HARM RISK INTERVENTIONS

## 2023-08-16 NOTE — ASU PATIENT PROFILE, ADULT - NSICDXPASTMEDICALHX_GEN_ALL_CORE_FT
PAST MEDICAL HISTORY:  A-fib     H/O inguinal hernia repair     High cholesterol     Hypertension      PAST MEDICAL HISTORY:  A-fib     Dementia     H/O inguinal hernia repair     High cholesterol     Hypertension

## 2023-08-17 DIAGNOSIS — I48.19 OTHER PERSISTENT ATRIAL FIBRILLATION: ICD-10-CM

## 2023-09-07 ENCOUNTER — APPOINTMENT (OUTPATIENT)
Dept: CARDIOLOGY | Facility: CLINIC | Age: 83
End: 2023-09-07
Payer: MEDICARE

## 2023-09-07 ENCOUNTER — NON-APPOINTMENT (OUTPATIENT)
Age: 83
End: 2023-09-07

## 2023-09-07 PROBLEM — F03.90 UNSPECIFIED DEMENTIA WITHOUT BEHAVIORAL DISTURBANCE: Chronic | Status: ACTIVE | Noted: 2023-08-16

## 2023-09-07 PROCEDURE — 93296 REM INTERROG EVL PM/IDS: CPT

## 2023-09-07 PROCEDURE — 93295 DEV INTERROG REMOTE 1/2/MLT: CPT

## 2023-09-13 NOTE — ED ADULT NURSE NOTE - NSFALLRSKASSESASSIST_ED_ALL_ED
Patient has hyponatremia which is uncontrolled,We will aim to correct the sodium by 4-6mEq in 24 hours. We will monitor sodium Daily. The hyponatremia is due to Dehydration/hypovolemia. We will obtain the following studies: Urine sodium, urine osmolality, serum osmolality. We will treat the hyponatremia with IV fluids. The patient's sodium results have been reviewed and are listed below.  Recent Labs   Lab 09/13/23  0525         no

## 2023-10-04 ENCOUNTER — APPOINTMENT (OUTPATIENT)
Dept: ELECTROPHYSIOLOGY | Facility: CLINIC | Age: 83
End: 2023-10-04

## 2023-10-06 ENCOUNTER — APPOINTMENT (OUTPATIENT)
Dept: CARDIOLOGY | Facility: CLINIC | Age: 83
End: 2023-10-06

## 2023-12-20 ENCOUNTER — APPOINTMENT (OUTPATIENT)
Dept: ELECTROPHYSIOLOGY | Facility: CLINIC | Age: 83
End: 2023-12-20
Payer: MEDICARE

## 2023-12-20 VITALS
SYSTOLIC BLOOD PRESSURE: 122 MMHG | HEIGHT: 74 IN | HEART RATE: 68 BPM | BODY MASS INDEX: 24.51 KG/M2 | DIASTOLIC BLOOD PRESSURE: 72 MMHG | WEIGHT: 191 LBS

## 2023-12-20 PROCEDURE — 93283 PRGRMG EVAL IMPLANTABLE DFB: CPT

## 2023-12-20 PROCEDURE — 93290 INTERROG DEV EVAL ICPMS IP: CPT | Mod: 26

## 2023-12-20 PROCEDURE — 93000 ELECTROCARDIOGRAM COMPLETE: CPT | Mod: 59

## 2023-12-20 RX ORDER — NEOMYCIN SULFATE, POLYMYXIN B SULFATE AND HYDROCORTISONE 3.5; 10000; 1 MG/ML; [IU]/ML; MG/ML
3.5-10000-1 SOLUTION AURICULAR (OTIC)
Qty: 10 | Refills: 0 | Status: DISCONTINUED | COMMUNITY
Start: 2022-03-31 | End: 2023-12-20

## 2024-01-07 NOTE — ASSESSMENT
[FreeTextEntry1] : ## Dilated Nonischemic cardiomyopathy s/p SC-ICD (VDD, Biotronik, 14, Non-dep) ## SVT ## Paroxysmal AF s/p Amulet   - ICD interrogation shows normally functioning DC-ICD. Battery life normal. Optivol below threshold. + AF episodes. - Discussed different management option including clinical observation +/- medication and ablation. After detailed discussion, patient opted for conservative management. Patient will contact us if more episodes or changes the mind. - Continue Amiodarone. will reduce dose further. - CHADSVASC 4+ (Age, Cardiomyopathy, HTN). - S/p amulet. Unclear if he is taking his medications, especially Clopidogrel. - Will continue with Aspirin and Clopidogrel as best as we can. - Losartan, Metoprolol for GDMT.  - Remote monitoring. - Return in 6 months.

## 2024-01-07 NOTE — REASON FOR VISIT
[Follow-up Device Check] : is here today for a follow-up device check visit for [Arrhythmias (seen on stored data)] : arrhythmias seen on stored data [Family Member] : family member [MOON (Elective Replacement Indication)] : elective replacement indication

## 2024-01-07 NOTE — HISTORY OF PRESENT ILLNESS
[de-identified] : The patient noted the following symptom(s):. I had a pleasure of seeing Mr. WAITE for follow-up consultation for ICD care. He was previously being followed by Dr. Mao.  Mr. 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  10/20: Feels good. Mild HERNANDEZ. Occasional palpitations when hes doing his 20 lbs dumbbells. He appears to be very happy. Didn't bring meds or remember them. 08/18/22: Feels fine. He is accompanied by his daughter. +SVT episodes on ICD. More forgetful. 02/22/2023: Feels fine. He is accompanied by daughter. Daughter states he is more forgetful. Demented. Aggressive at times. Does not allow any protective services or support systems in his house.  3/22/23: Feels fine. Accompanied by daughter.  06/21/2023: Feels fine. Accompanied by daughter. Unclear if he is compliant with his medication. Refused visiting RN. 12/20/23: Feels fine. Unclear if compliant with meds.  Denies chest pain, shortness of breath, palpitation, dizziness or LOC except noted above.  EKG (12/20/23): SR EKG (06/21/2023): SR @ 84 EKG (3/22/23): SR. EKG (02/22/2023): SR@90 bpm, PVCs.  EKG (08/18/22): SR EKG (10/20): SR@71, PVC (RVOT?) EKG: SR@ 57/min, QRSd 90 ms,  ms TTE (19): EF 55% (?) Cardio: Dr. Paul

## 2024-01-23 NOTE — PRE-ANESTHESIA EVALUATION ADULT - NSANTHALCOHOLSD_GEN_ALL_CORE
No Duration Of Freeze Thaw-Cycle (Seconds): 0 Render Note In Bullet Format When Appropriate: No Detail Level: Detailed Show Applicator Variable?: Yes Consent: The patient's consent was obtained including but not limited to risks of crusting, scabbing, blistering, scarring, darker or lighter pigmentary change, recurrence, incomplete removal and infection. Post-Care Instructions: I reviewed with the patient in detail post-care instructions. Patient is to wear sunprotection, and avoid picking at any of the treated lesions. Pt may apply Vaseline to crusted or scabbing areas. Medical Necessity Information: It is in your best interest to select a reason for this procedure from the list below. All of these items fulfill various CMS LCD requirements except the new and changing color options. Spray Paint Text: The liquid nitrogen was applied to the skin utilizing a spray paint frosting technique. Medical Necessity Clause: This procedure was medically necessary because the lesions that were treated were:

## 2024-03-18 ENCOUNTER — NON-APPOINTMENT (OUTPATIENT)
Age: 84
End: 2024-03-18

## 2024-03-19 ENCOUNTER — APPOINTMENT (OUTPATIENT)
Dept: CARDIOLOGY | Facility: CLINIC | Age: 84
End: 2024-03-19
Payer: MEDICARE

## 2024-03-19 PROCEDURE — 93295 DEV INTERROG REMOTE 1/2/MLT: CPT

## 2024-03-19 PROCEDURE — 93296 REM INTERROG EVL PM/IDS: CPT

## 2024-06-15 NOTE — H&P PST ADULT - RADIAL PULSE
-- right normal/left normal

## 2024-06-17 ENCOUNTER — NON-APPOINTMENT (OUTPATIENT)
Age: 84
End: 2024-06-17

## 2024-06-18 ENCOUNTER — APPOINTMENT (OUTPATIENT)
Dept: CARDIOLOGY | Facility: CLINIC | Age: 84
End: 2024-06-18
Payer: MEDICARE

## 2024-06-18 ENCOUNTER — RESULT CHARGE (OUTPATIENT)
Age: 84
End: 2024-06-18

## 2024-06-18 PROCEDURE — 93296 REM INTERROG EVL PM/IDS: CPT

## 2024-06-18 PROCEDURE — 93295 DEV INTERROG REMOTE 1/2/MLT: CPT

## 2024-06-19 ENCOUNTER — APPOINTMENT (OUTPATIENT)
Dept: ELECTROPHYSIOLOGY | Facility: CLINIC | Age: 84
End: 2024-06-19
Payer: MEDICARE

## 2024-06-19 VITALS
HEART RATE: 71 BPM | HEIGHT: 74 IN | DIASTOLIC BLOOD PRESSURE: 70 MMHG | BODY MASS INDEX: 24.38 KG/M2 | WEIGHT: 190 LBS | SYSTOLIC BLOOD PRESSURE: 120 MMHG

## 2024-06-19 PROCEDURE — 93290 INTERROG DEV EVAL ICPMS IP: CPT | Mod: 26

## 2024-06-19 PROCEDURE — 99214 OFFICE O/P EST MOD 30 MIN: CPT

## 2024-06-19 PROCEDURE — 93282 PRGRMG EVAL IMPLANTABLE DFB: CPT

## 2024-06-19 PROCEDURE — 93000 ELECTROCARDIOGRAM COMPLETE: CPT | Mod: 59

## 2024-06-19 PROCEDURE — G2211 COMPLEX E/M VISIT ADD ON: CPT

## 2024-07-01 NOTE — PROCEDURE
[No] : not [ICD] : Implantable cardioverter-defibrillator [VDI] : VDI [Longevity: ___ months] : The estimated remaining battery life is [unfilled] months [Normal] : The battery status is normal. [Lead Imp:  ___ohms] : lead impedance was [unfilled] ohms [Sensing Amplitude ___mv] : sensing amplitude was [unfilled] mv [___V @] : [unfilled] V [___ ms] : [unfilled] ms [de-identified] : Sigmascreeningk [de-identified] : ANA HORTON [de-identified] : [id number] [de-identified] : 04/03/2023 [de-identified] : 42 [de-identified] :  0% AF burden 1.1 % 6 nsvt short in duration VDD device

## 2024-07-01 NOTE — ASSESSMENT
[FreeTextEntry1] : ## Dilated Nonischemic cardiomyopathy s/p SC-ICD (VDD, Biotronik, 14, Non-dep) ## SVT ## Paroxysmal AF s/p Amulet   - ICD interrogation shows normally functioning DC-ICD. Battery life normal. Optivol below threshold. + AF episodes. - Discussed different management option including clinical observation +/- medication and ablation. After detailed discussion, patient opted for conservative management. Patient will contact us if more episodes or changes the mind. - Continue Amiodarone. will reduce dose further. - CHADSVASC 4+ (Age, Cardiomyopathy, HTN). - S/p amulet. Unclear if he is taking his medications.  - Will continue with Aspirin as best we can.   - Losartan, Metoprolol for GDMT.  - Remote monitoring. - Return in 6 months.

## 2024-07-01 NOTE — ADDENDUM
[FreeTextEntry1] : Leesa PRICE assisted in documentation on 07/01/2024 acting as a scribe for Dr. Vinod Brown.

## 2024-07-01 NOTE — HISTORY OF PRESENT ILLNESS
[de-identified] : The patient noted the following symptom(s):. I had a pleasure of seeing Mr. WAITE for follow-up consultation for ICD care. He was previously being followed by Dr. Mao.  Mr. 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  10/20: Feels good. Mild HERNANDEZ. Occasional palpitations when hes doing his 20 lbs dumbbells. He appears to be very happy. Didn't bring meds or remember them. 08/18/22: Feels fine. He is accompanied by his daughter. +SVT episodes on ICD. More forgetful. 02/22/2023: Feels fine. He is accompanied by daughter. Daughter states he is more forgetful. Demented. Aggressive at times. Does not allow any protective services or support systems in his house.  3/22/23: Feels fine. Accompanied by daughter.  06/21/2023: Feels fine. Accompanied by daughter. Unclear if he is compliant with his medication. Refused visiting RN. 12/20/23: Feels fine. Unclear if compliant with meds. 06/19/2024: Feels fine. Accompanied by daughter. Unclear if he takes medications.    Denies chest pain, shortness of breath, palpitation, dizziness or LOC except noted above.  EKG (06/19/2024): SR   EKG (12/20/23): SR EKG (06/21/2023): SR @ 84 EKG (3/22/23): SR. EKG (02/22/2023): SR@90 bpm, PVCs.  EKG (08/18/22): SR EKG (10/20): SR@71, PVC (RVOT?) EKG: SR@ 57/min, QRSd 90 ms,  ms TTE (19): EF 55% (?) Cardio: Dr. Paul

## 2024-09-16 ENCOUNTER — NON-APPOINTMENT (OUTPATIENT)
Age: 84
End: 2024-09-16

## 2024-09-17 ENCOUNTER — APPOINTMENT (OUTPATIENT)
Dept: CARDIOLOGY | Facility: CLINIC | Age: 84
End: 2024-09-17
Payer: MEDICARE

## 2024-09-17 PROCEDURE — 93295 DEV INTERROG REMOTE 1/2/MLT: CPT

## 2024-09-17 PROCEDURE — 93296 REM INTERROG EVL PM/IDS: CPT

## 2024-12-11 ENCOUNTER — APPOINTMENT (OUTPATIENT)
Dept: ELECTROPHYSIOLOGY | Facility: CLINIC | Age: 84
End: 2024-12-11
Payer: MEDICARE

## 2024-12-11 VITALS
SYSTOLIC BLOOD PRESSURE: 120 MMHG | DIASTOLIC BLOOD PRESSURE: 80 MMHG | HEIGHT: 74 IN | BODY MASS INDEX: 24.51 KG/M2 | HEART RATE: 57 BPM | WEIGHT: 191 LBS | TEMPERATURE: 97.1 F

## 2024-12-11 PROCEDURE — 93000 ELECTROCARDIOGRAM COMPLETE: CPT | Mod: 59

## 2024-12-11 PROCEDURE — G2211 COMPLEX E/M VISIT ADD ON: CPT

## 2024-12-11 PROCEDURE — 93290 INTERROG DEV EVAL ICPMS IP: CPT | Mod: 26

## 2024-12-11 PROCEDURE — 93283 PRGRMG EVAL IMPLANTABLE DFB: CPT

## 2024-12-11 PROCEDURE — 99214 OFFICE O/P EST MOD 30 MIN: CPT

## 2024-12-11 RX ORDER — ASPIRIN 81 MG/1
81 TABLET ORAL DAILY
Refills: 0 | Status: ACTIVE | COMMUNITY

## 2025-03-12 ENCOUNTER — NON-APPOINTMENT (OUTPATIENT)
Age: 85
End: 2025-03-12

## 2025-03-12 ENCOUNTER — APPOINTMENT (OUTPATIENT)
Dept: CARDIOLOGY | Facility: CLINIC | Age: 85
End: 2025-03-12
Payer: MEDICARE

## 2025-03-12 PROCEDURE — 93296 REM INTERROG EVL PM/IDS: CPT

## 2025-03-12 PROCEDURE — 93295 DEV INTERROG REMOTE 1/2/MLT: CPT

## 2025-06-11 ENCOUNTER — NON-APPOINTMENT (OUTPATIENT)
Age: 85
End: 2025-06-11

## 2025-06-11 ENCOUNTER — APPOINTMENT (OUTPATIENT)
Dept: CARDIOLOGY | Facility: CLINIC | Age: 85
End: 2025-06-11
Payer: MEDICARE

## 2025-06-11 PROCEDURE — 93296 REM INTERROG EVL PM/IDS: CPT

## 2025-06-11 PROCEDURE — 93295 DEV INTERROG REMOTE 1/2/MLT: CPT

## 2025-09-10 ENCOUNTER — APPOINTMENT (OUTPATIENT)
Dept: CARDIOLOGY | Facility: CLINIC | Age: 85
End: 2025-09-10
Payer: MEDICARE

## 2025-09-10 ENCOUNTER — NON-APPOINTMENT (OUTPATIENT)
Age: 85
End: 2025-09-10

## 2025-09-10 PROCEDURE — 93295 DEV INTERROG REMOTE 1/2/MLT: CPT

## 2025-09-10 PROCEDURE — 93296 REM INTERROG EVL PM/IDS: CPT
